# Patient Record
Sex: FEMALE | Race: WHITE | NOT HISPANIC OR LATINO | Employment: FULL TIME | URBAN - METROPOLITAN AREA
[De-identification: names, ages, dates, MRNs, and addresses within clinical notes are randomized per-mention and may not be internally consistent; named-entity substitution may affect disease eponyms.]

---

## 2024-10-03 ENCOUNTER — OFFICE VISIT (OUTPATIENT)
Dept: OBSTETRICS AND GYNECOLOGY | Age: 29
End: 2024-10-03
Payer: COMMERCIAL

## 2024-10-03 VITALS
DIASTOLIC BLOOD PRESSURE: 72 MMHG | WEIGHT: 167 LBS | SYSTOLIC BLOOD PRESSURE: 108 MMHG | HEIGHT: 62 IN | BODY MASS INDEX: 30.73 KG/M2

## 2024-10-03 DIAGNOSIS — E28.2 PCOS (POLYCYSTIC OVARIAN SYNDROME): ICD-10-CM

## 2024-10-03 DIAGNOSIS — Z01.419 ENCOUNTER FOR GYNECOLOGICAL EXAMINATION WITHOUT ABNORMAL FINDING: Primary | ICD-10-CM

## 2024-10-03 DIAGNOSIS — D75.1 SECONDARY POLYCYTHEMIA: ICD-10-CM

## 2024-10-03 DIAGNOSIS — N97.9 INFERTILITY, FEMALE: ICD-10-CM

## 2024-10-03 PROCEDURE — 99385 PREV VISIT NEW AGE 18-39: CPT | Performed by: OBSTETRICS & GYNECOLOGY

## 2024-10-03 RX ORDER — PROPRANOLOL HCL 20 MG
20 TABLET ORAL
COMMUNITY
Start: 2024-10-01

## 2024-10-03 NOTE — PROGRESS NOTES
Subjective     Chief Complaint   Patient presents with    Gynecologic Exam     New Gyn trying for pregnancy       History of Present Illness    Tuyet Bailey is a 29 y.o.  who presents for annual exam.  Patient is a new patient to me.  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  She previously worked for [employer].  She has a history of PCOS and anovulation.  She has difficulty with acne.  She reports prior to oral contraceptive pills she would have a cycle but every 3 months.  When she more recently stopped oral contraceptive pills she had amenorrhea for 4 months.  She is currently working with Dr. Benedict and taking Femara.      Patient has a significant history of polycythemia with hemoglobin levels as high as 17.  Her most recent was 14.  She has had a full workup with hematology.  She was told that her erythropoietin and kidney function was normal.  Treatment is just to donate blood twice yearly.  She was told to avoid iron supplementation even in pregnancy.  She was told that this may be an evolutionary change to adjust for women in the family may have had very heavy bleeding at birth.  Patient does not have a history of nosebleeds or excessive bleeding.  Her mother and sister have not had any trouble with deliveries.    [facility] 6 months ago.  She does not want to have another Pap right now.      She occasionally takes Inderal for anxiety  Obstetric History:  OB History          0    Para   0    Term   0       0    AB   0    Living   0         SAB   0    IAB   0    Ectopic   0    Molar   0    Multiple   0    Live Births   0               Menstrual History:     Patient's last menstrual period was 2024.         Current contraception: none  History of abnormal Pap smear: no  Received Gardasil immunization: yes  Perform regular self breast exam : no  Family history of uterine or ovarian cancer:  "no  Family History of colon cancer: no  Family history of breast cancer: no    Mammogram: not indicated.  Colonoscopy: not indicated.  DEXA: not indicated.    Exercise: exercises 5 times a week  Calcium/Vitamin D: adequate intake    The following portions of the patient's history were reviewed and updated as appropriate: allergies, current medications, past family history, past medical history, past social history, past surgical history, and problem list.      Objective   Physical Exam    /72   Ht 157.5 cm (62\")   Wt 75.8 kg (167 lb)   LMP 09/16/2024   BMI 30.54 kg/m²     General:   alert, appears stated age and cooperative   Neck: thyroid normal to palpation   Heart: regular rate and rhythm   Lungs: clear to auscultation bilaterally   Abdomen: soft, non-tender, without masses or organomegaly   Breast: inspection negative, no nipple discharge or bleeding, no masses or nodularity palpable   Vulva: normal, Bartholin's, Urethra, [place]'s normal   Vagina: normal mucosa, normal discharge   Cervix: no cervical motion tenderness and no lesions   Uterus: non-tender, normal shape and consistency   Adnexa: no mass, fullness, tenderness   Rectal: not indicated     Assessment & Plan   Diagnoses and all orders for this visit:    1. Encounter for gynecological examination without abnormal finding (Primary)    2. PCOS (polycystic ovarian syndrome)    3. Infertility, female    4. Secondary polycythemia    Patient will continue to work with Dr. Benedcit and call us back when she is pregnant.  We discussed she can take a vitamin without iron during the pregnancy and we can continue to follow her levels along with hematology.  Patient is currently on 7.5 mg of Femara and supplementing with vaginal progesterone.    All questions answered.  Breast self exam technique reviewed and patient encouraged to perform self-exam monthly.  Discussed healthy lifestyle modifications.  Recommended 30 minutes of aerobic exercise five times per " week.  Discussed calcium needs to prevent osteoporosis.

## 2024-10-22 ENCOUNTER — LAB (OUTPATIENT)
Dept: LAB | Facility: HOSPITAL | Age: 29
End: 2024-10-22
Payer: COMMERCIAL

## 2024-10-22 ENCOUNTER — TRANSCRIBE ORDERS (OUTPATIENT)
Dept: ADMINISTRATIVE | Facility: HOSPITAL | Age: 29
End: 2024-10-22
Payer: COMMERCIAL

## 2024-10-22 DIAGNOSIS — E22.1 IDIOPATHIC HYPERPROLACTINEMIA: Primary | ICD-10-CM

## 2024-10-22 DIAGNOSIS — E22.1 IDIOPATHIC HYPERPROLACTINEMIA: ICD-10-CM

## 2024-10-22 LAB — TSH SERPL DL<=0.05 MIU/L-ACNC: 2.22 UIU/ML (ref 0.27–4.2)

## 2024-10-22 PROCEDURE — 36415 COLL VENOUS BLD VENIPUNCTURE: CPT

## 2024-10-22 PROCEDURE — 84443 ASSAY THYROID STIM HORMONE: CPT

## 2024-12-02 ENCOUNTER — TELEPHONE (OUTPATIENT)
Dept: OBSTETRICS AND GYNECOLOGY | Age: 29
End: 2024-12-02
Payer: COMMERCIAL

## 2024-12-02 DIAGNOSIS — O21.9 NAUSEA/VOMITING IN PREGNANCY: Primary | ICD-10-CM

## 2024-12-02 RX ORDER — VALACYCLOVIR HYDROCHLORIDE 1 G/1
1000 TABLET, FILM COATED ORAL 2 TIMES DAILY
Qty: 4 TABLET | Refills: 0 | Status: SHIPPED | OUTPATIENT
Start: 2024-12-02 | End: 2024-12-05

## 2024-12-02 RX ORDER — DOXYLAMINE SUCCINATE AND PYRIDOXINE HYDROCHLORIDE 20; 20 MG/1; MG/1
1 TABLET, EXTENDED RELEASE ORAL 2 TIMES DAILY
Qty: 60 TABLET | Refills: 3 | Status: SHIPPED | OUTPATIENT
Start: 2024-12-02

## 2024-12-02 NOTE — TELEPHONE ENCOUNTER
PT seen by [facility] with Dr Benedict. PT currently 6wks and is scheduled to see us at 10/11weeks on January 3rd. PT states that Unisom is working but was wondering if she could get prescribed Bonjesta?

## 2025-01-02 ENCOUNTER — INITIAL PRENATAL (OUTPATIENT)
Dept: OBSTETRICS AND GYNECOLOGY | Age: 30
End: 2025-01-02
Payer: COMMERCIAL

## 2025-01-02 VITALS — BODY MASS INDEX: 33.4 KG/M2 | WEIGHT: 182.6 LBS | DIASTOLIC BLOOD PRESSURE: 72 MMHG | SYSTOLIC BLOOD PRESSURE: 124 MMHG

## 2025-01-02 DIAGNOSIS — Z3A.10 10 WEEKS GESTATION OF PREGNANCY: ICD-10-CM

## 2025-01-02 DIAGNOSIS — Z34.81 PRENATAL CARE, SUBSEQUENT PREGNANCY, FIRST TRIMESTER: ICD-10-CM

## 2025-01-02 DIAGNOSIS — E28.2 PCOS (POLYCYSTIC OVARIAN SYNDROME): ICD-10-CM

## 2025-01-02 DIAGNOSIS — D75.1 SECONDARY POLYCYTHEMIA: ICD-10-CM

## 2025-01-02 DIAGNOSIS — Z34.90 PREGNANCY, UNSPECIFIED GESTATIONAL AGE: ICD-10-CM

## 2025-01-02 DIAGNOSIS — Z13.89 SCREENING FOR BLOOD OR PROTEIN IN URINE: Primary | ICD-10-CM

## 2025-01-02 DIAGNOSIS — Z31.430 ENCOUNTER OF FEMALE FOR TESTING FOR GENETIC DISEASE CARRIER STATUS FOR PROCREATIVE MANAGEMENT: ICD-10-CM

## 2025-01-02 LAB
FERRITIN SERPL-MCNC: 41.2 NG/ML (ref 13–150)
GLUCOSE UR STRIP-MCNC: NEGATIVE MG/DL
PROT UR STRIP-MCNC: NEGATIVE MG/DL

## 2025-01-03 PROBLEM — O26.899 RH NEGATIVE, ANTEPARTUM: Status: ACTIVE | Noted: 2025-01-03

## 2025-01-03 PROBLEM — Z67.91 RH NEGATIVE, ANTEPARTUM: Status: ACTIVE | Noted: 2025-01-03

## 2025-01-03 LAB
ABO GROUP BLD: ABNORMAL
BASOPHILS # BLD AUTO: 0.1 X10E3/UL (ref 0–0.2)
BASOPHILS NFR BLD AUTO: 1 %
BLD GP AB SCN SERPL QL: NEGATIVE
EOSINOPHIL # BLD AUTO: 0.1 X10E3/UL (ref 0–0.4)
EOSINOPHIL NFR BLD AUTO: 1 %
ERYTHROCYTE [DISTWIDTH] IN BLOOD BY AUTOMATED COUNT: 13.2 % (ref 11.7–15.4)
HBV SURFACE AG SERPL QL IA: NEGATIVE
HCT VFR BLD AUTO: 44.3 % (ref 34–46.6)
HCV AB SERPL QL IA: NORMAL
HCV IGG SERPL QL IA: NON REACTIVE
HGB BLD-MCNC: 15 G/DL (ref 11.1–15.9)
HIV 1+2 AB+HIV1 P24 AG SERPL QL IA: NON REACTIVE
IMM GRANULOCYTES # BLD AUTO: 0 X10E3/UL (ref 0–0.1)
IMM GRANULOCYTES NFR BLD AUTO: 0 %
LYMPHOCYTES # BLD AUTO: 1.7 X10E3/UL (ref 0.7–3.1)
LYMPHOCYTES NFR BLD AUTO: 17 %
MCH RBC QN AUTO: 29.3 PG (ref 26.6–33)
MCHC RBC AUTO-ENTMCNC: 33.9 G/DL (ref 31.5–35.7)
MCV RBC AUTO: 87 FL (ref 79–97)
MONOCYTES # BLD AUTO: 0.9 X10E3/UL (ref 0.1–0.9)
MONOCYTES NFR BLD AUTO: 9 %
NEUTROPHILS # BLD AUTO: 7.1 X10E3/UL (ref 1.4–7)
NEUTROPHILS NFR BLD AUTO: 72 %
PLATELET # BLD AUTO: 260 X10E3/UL (ref 150–450)
RBC # BLD AUTO: 5.12 X10E6/UL (ref 3.77–5.28)
RH BLD: NEGATIVE
RPR SER QL: NON REACTIVE
RUBV IGG SERPL IA-ACNC: 10.9 INDEX
WBC # BLD AUTO: 9.8 X10E3/UL (ref 3.4–10.8)

## 2025-01-03 NOTE — PROGRESS NOTES
Chief Complaint   Patient presents with    Initial Prenatal Visit     New Ob U/s Lmp 10/22/24  Cc: no complaints today        HPI: 29 y.o.  at 10w5d by ultrasound and IUI dating.  Patient worked with Dr. Benedict.  She had IUI with Femara and trigger shot.  Patient is here today with her .  She is very excited about the pregnancy.  Patient has significant polycythemia.  She follows with Dr. Castillo from [facility].  She typically has to donate blood about 2 times a year to maintain hematocrit below 45.  She has talked with him about pregnancy and he recommended avoiding prenatal vitamins with iron unless the patient is anemic.  Patient is taking a gummy vitamin with no iron.  He also recommended against transfusion unless hemoglobin is less than 7.  Patient has noted some rapid weight gain.  She has not been very active due to fatigue and has been craving a lot of carbohydrates.  She does not think she can do a 1 hour glucose test today but can come back soon to do that.    Full history is reviewed    Relevant data reviewed:    Last OB US Data (since 2024)       None          Vitals:    25 0905   BP: 124/72   Weight: 82.8 kg (182 lb 9.6 oz)     Total weight gain for pregnancy:  Not found.      Review of systems:   Gen: fatigue  CV:     negative  GI: negative  :   negative  MS:    negative  Neuro: negative  Pul: negative    Physical Exam  Constitutional:       General: She is not in acute distress.     Appearance: Normal appearance. She is obese. She is not ill-appearing.   Cardiovascular:      Rate and Rhythm: Normal rate and regular rhythm.   Pulmonary:      Breath sounds: Normal breath sounds.   Abdominal:      General: Abdomen is flat.      Palpations: Abdomen is soft.   Neurological:      Mental Status: She is alert.   Psychiatric:         Mood and Affect: Mood normal.         Thought Content: Thought content normal.         Judgment: Judgment normal.         A/P  1. Intrauterine pregnancy  at 10w5d   2. Pregnancy Risk:  HIGH RISK     Diagnoses and all orders for this visit:    1. Screening for blood or protein in urine (Primary)  -     POC Urinalysis Dipstick  -     Urine Culture - Urine, Urine, Random Void    2. 10 weeks gestation of pregnancy  -     OB Panel With HIV and RPR  -     Gestational Diabetes Screen 1 Hour; Future  -     Chlamydia trachomatis, Neisseria gonorrhoeae, PCR - Urine, Urine, Clean Catch    3. Prenatal care, subsequent pregnancy, first trimester  -     Mesfin Panorama Prenatal Test: Chromosomes 13, 18, 21, X & Y: Triploidy 22Q.11.2 Deletion - Blood,    4. Encounter of female for testing for genetic disease carrier status for procreative management  -     Cancel: Apttus 14 (Pan-Ethnic Standard) - Blood,    5. Secondary polycythemia  Overview:  Patient's hematologist is Dr Anna Corbin  He recommended prenatal vitamins without iron unless patient is anemic and avoiding transfusions unless hemoglobin less than 7.    Orders:  -     Ferritin    6. PCOS (polycystic ovarian syndrome)  -     Gestational Diabetes Screen 1 Hour; Future    7. Pregnancy, unspecified gestational age    Patient has secondary polycythemia.  She does follow with hematology through [facility].  Hematology had recommended avoiding prenatal vitamins with iron.  Patient is taking a gummy prenatal vitamin.  We discussed high iron needs during pregnancy.  We will need to follow her for anemia.  Iron was recommended if the patient does become anemic.  Blood transfusion was recommended to be avoided unless the patient is hemoglobin less than 7.  She will continue to follow with hematology and I will refer also to [facility].  Check ferritin today.    Patient has had rapid weight gain and has history of PCOS.  Recommend early 1 hour    Patient has severe NSAID allergy so she cannot take low-dose aspirin.    Patient previously had carrier testing through Dr. Benedict's office.  Cell free DNA testing will be done today along  with initial OB labs.    New OB information was reviewed in detail with the patient and her .  Folder was also given.    Nutrition and weight gain were addressed.  Practice OB call structure was discussed.   Encouraged exercise at least 3 x weekly   -----------------------  PLAN:   Return in about 4 weeks (around 1/30/2025) for labs on 1/15 .    Jim Goldberg MD  1/2/2025 19:10 EST

## 2025-01-04 LAB
BACTERIA UR CULT: NORMAL
BACTERIA UR CULT: NORMAL
C TRACH RRNA SPEC QL NAA+PROBE: NEGATIVE
N GONORRHOEA RRNA SPEC QL NAA+PROBE: NEGATIVE

## 2025-01-15 ENCOUNTER — LAB (OUTPATIENT)
Dept: OBSTETRICS AND GYNECOLOGY | Age: 30
End: 2025-01-15
Payer: COMMERCIAL

## 2025-01-15 ENCOUNTER — TRANSCRIBE ORDERS (OUTPATIENT)
Dept: ULTRASOUND IMAGING | Facility: HOSPITAL | Age: 30
End: 2025-01-15
Payer: COMMERCIAL

## 2025-01-15 DIAGNOSIS — D75.1 SECONDARY POLYCYTHEMIA: Primary | ICD-10-CM

## 2025-01-15 DIAGNOSIS — Z13.1 SCREENING FOR DIABETES MELLITUS: Primary | ICD-10-CM

## 2025-01-15 DIAGNOSIS — Z3A.10 10 WEEKS GESTATION OF PREGNANCY: ICD-10-CM

## 2025-01-15 DIAGNOSIS — E28.2 PCOS (POLYCYSTIC OVARIAN SYNDROME): ICD-10-CM

## 2025-01-15 LAB — GLUCOSE 1H P 50 G GLC PO SERPL-MCNC: 92 MG/DL (ref 65–139)

## 2025-01-21 ENCOUNTER — HOSPITAL ENCOUNTER (OUTPATIENT)
Dept: ULTRASOUND IMAGING | Facility: HOSPITAL | Age: 30
Discharge: HOME | End: 2025-01-21
Admitting: OBSTETRICS & GYNECOLOGY
Payer: COMMERCIAL

## 2025-01-21 ENCOUNTER — OFFICE VISIT (OUTPATIENT)
Dept: OBSTETRICS AND GYNECOLOGY | Facility: CLINIC | Age: 30
End: 2025-01-21
Payer: COMMERCIAL

## 2025-01-21 VITALS
HEIGHT: 62 IN | TEMPERATURE: 97.7 F | DIASTOLIC BLOOD PRESSURE: 90 MMHG | BODY MASS INDEX: 34.56 KG/M2 | HEART RATE: 76 BPM | SYSTOLIC BLOOD PRESSURE: 128 MMHG | WEIGHT: 187.8 LBS

## 2025-01-21 DIAGNOSIS — D75.1 SECONDARY POLYCYTHEMIA: Primary | ICD-10-CM

## 2025-01-21 DIAGNOSIS — O10.919 CHRONIC HYPERTENSION AFFECTING PREGNANCY: ICD-10-CM

## 2025-01-21 DIAGNOSIS — D75.1 SECONDARY POLYCYTHEMIA: ICD-10-CM

## 2025-01-21 PROCEDURE — 76815 OB US LIMITED FETUS(S): CPT

## 2025-01-21 NOTE — PROGRESS NOTES
MATERNAL FETAL MEDICINE Consult Note    Dear Dr Jim Goldberg MD:    Thank you for your kind referral of Tuyet Bailey.  As you know, she is a 29 y.o.   13w3d gestation (Estimated Date of Delivery: 25). This is a consult.      Her antepartum course is complicated by:  Secondary polycythemia  CHTN    Aneuploidy Screening: low risk    HPI: Today, she denies headache, blurry vision, RUQ pain. No vaginal bleeding, no contractions.     Review of History:  Past Medical History:   Diagnosis Date    Anxiety     Chronic hypertension     occasional higher diastolic values- off BP meds x2 years    HSV infection     oral HSV- no outbreaks in years    Infertility, female     IUI pregnancy    Secondary polycythemia      Past Surgical History:   Procedure Laterality Date    WISDOM TOOTH EXTRACTION      no excessive bleeding       Social History     Socioeconomic History    Marital status:      Spouse name: Jose Luis  PT   Tobacco Use    Smoking status: Never     Passive exposure: Never    Smokeless tobacco: Never   Vaping Use    Vaping status: Never Used   Substance and Sexual Activity    Alcohol use: Not Currently    Drug use: Never    Sexual activity: Yes     Partners: Male     Birth control/protection: None     Family History   Problem Relation Age of Onset    Diabetes Father     Kidney cancer Father       Allergies   Allergen Reactions    Nsaids Anaphylaxis     Lip swelling    Sulfa Antibiotics Swelling      Current Outpatient Medications on File Prior to Visit   Medication Sig Dispense Refill    doxylamine-pyridoxine ER (Bonjesta) 20-20 MG tablet controlled-release tablet Take 1 tablet by mouth 2 (Two) Times a Day. 60 tablet 3    Prenatal Vit-Fe Fumarate-FA (PRENATAL VITAMINS PO)        No current facility-administered medications on file prior to visit.        Past obstetric, gynecological, medical, surgical, family and social history reviewed.  Relevant lab work and imaging reviewed.    Review of  "systems  Constitutional:  denies fever, chills, malaise.   ENT/Mouth:  denies sore throat, tinnitus  Eyes: denies vision changes/pain  CV:  denies chest pain  Respiratory:  denies cough/SOB  GI:  denies N/V, diarrhea, abdominal pain.    :   denies dysuria  Skin:  denies lesions or pruritus   Neuro:  denies weakness, focal neurologic symptoms    Vitals:    25 1441 25 1448   BP: 130/90 128/90   BP Location: Right arm Left arm   Patient Position: Sitting Sitting   Pulse: 76    Temp: 97.7 °F (36.5 °C)    TempSrc: Temporal    Weight: 85.2 kg (187 lb 12.8 oz)    Height: 157.5 cm (62\")        PHYSICAL EXAM   GENERAL: Not in acute distress, AAOx3, pleasant  CARDIO: regular rate and rhythm  PULM: symmetric chest rise, speaking in complete sentences without difficulty  NEURO: awake, alert and oriented to person, place, and time  ABDOMINAL: No fundal tenderness, no rebound or guarding, gravid  EXTREMITIES: no bilateral lower extremity edema/tenderness  SKIN: Warm, well-perfused      ULTRASOUND   Please view full ultrasound note on Imaging tab   Live viable intrauterine pregnancy.    bpm, which is normal.   Grossly normal appearing early anatomy.   Size consistent with dates.   NT 1 mm, which is normal.      ASSESSMENT/COUNSELIN y.o.   13w3d gestation (Estimated Date of Delivery: 25).       -Pregnancy  [ X ] stable  [   ] improving [  ] worsening    Diagnoses and all orders for this visit:    1. Secondary polycythemia (Primary)  Overview:  Patient's hematologist is Dr Anna Corbin  He recommended prenatal vitamins without iron unless patient is anemic and avoiding transfusions unless hemoglobin less than 7.      2. Chronic hypertension affecting pregnancy         Secondary mild polycythemia:  Pt reports she has a very mild diagnosis and it is not polycythemia vera or one of the other more serious types.    Patient's hematologist is Dr Anna Corbin--He recommended prenatal vitamins " without iron unless patient is anemic and avoiding transfusions unless hemoglobin less than 7.  We discussed this today.  For polycythemia vera and other polycythemias, we often recommend lovenox during pregnancy due to increased clotting potential.  Will discuss with her hematologist.  I mentioned low dose aspirin, but patient has an allergy so this is not an option.      She was previously tested further for JAK2 ([id number]), which was not mutated. Her EPO level was low normal at 8 and she typically manages this with 2 phlebotomies a year.  I would recommend monthly CBC and if her Hct >45 per heme--they need to see her and do a phlebotomy.      Secondary polycythemia during pregnancy can be associated with increased risk of thromboembolism, leading to obstetric complications such as fetal loss, FGR, prematurity, and maternal thromboembolism.  I suspect because of how mild this is, she  likely will not be at significantly increased risk.  She does have CHTN, also mild, so will watch carefully for preE and FGR.      CHTN  No meds, no ASA due to allergy  We discussed the risks of chronic hypertension including intrauterine growth restriction, increased risk of preeclampsia, increased risk of premature delivery, and increased risk for placental abruption.  I reassured her that with mild hypertension, no underlying cardiac disease, and normal renal function, most women do well in pregnancy.    I explained that acceptable blood pressures in pregnancies with chronic hypertension and without renal damage are 120-140/70-90s. Newer data from the [facility] (2022, CHAP TRIAL), supports more aggressive bp treatment to below 140/90 (previously ,160 in CHTN) and that this does not impair fetal growth or well-being but reduces risks of pre-eclampsia,  birth, and other pregnancy morbidity.  I discussed this with the patient.  She said she runs mostly 120-130's at home with 80-90's diastolic.  If she has more consistently  higher than this (>140/95), would recommend starting procardia XR 30 daily.      I recommended serial growth ultrasounds every 4 weeks  to ensure appropriate fetal growth. In addition,  fetal testing 1-2x weekly should be initiated around 32 weeks gestation.  I would recommend a 38 week delivery given CHTN.   She may require closer to 37 depending on her control closer to delivery.      Discussed importance of ambulatory monitoring at different times during the day.  She will bring a log when she comes back next time for growth.        Summary of Plan  -Serial growth ultrasounds every 4 weeks (MFM)  -Starting at 32 weeks: at least weekly fetal  surveillance until delivery   -Will touch base with heme about anticoagulation.   -CBC's monthly at OB  -Delivery 38 weeks unless indicated sooner.      Follow-up: monthly    Thank you for the consult and opportunity to care for this patient.  Please feel free to reach out with any questions or concerns.      I spent 35 minutes caring for this patient on this date of service. This time includes time spent by me in the following activities: preparing for the visit, reviewing tests, obtaining and/or reviewing a separately obtained history, performing a medically appropriate examination and/or evaluation, counseling and educating the patient/family/caregiver and independently interpreting results and communicating that information with the patient/family/caregiver with greater than 50% spent in counseling and coordination of care.     I spent 6 minutes on the separately reported service of US imaging not included in the time used to support the E/M service also reported today.      Shruti Morton MD FACOG  Maternal Fetal Medicine-[facility]  Office: [phone]  [email or web address]

## 2025-01-21 NOTE — LETTER
2025     Jim Goldberg MD  [address]  [address]  [place] [facility] [address]    Patient: Tuyet Bailey   YOB: 1995   Date of Visit: 2025       Dear Jim Goldberg MD,    Thank you for referring Tuyet Bailey to me for evaluation. Below is a copy of my consult note.    If you have questions, please do not hesitate to call me. I look forward to following Tuyet along with you.         Sincerely,        Shruti Morton MD    MATERNAL FETAL MEDICINE Consult Note    Dear Dr Jim Goldberg MD:    Thank you for your kind referral of Tuyet Bailey.  As you know, she is a 29 y.o.   13w3d gestation (Estimated Date of Delivery: 25). This is a consult.      Her antepartum course is complicated by:  Secondary polycythemia  CHTN    Aneuploidy Screening: low risk    HPI: Today, she denies headache, blurry vision, RUQ pain. No vaginal bleeding, no contractions.     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 outbreaks in years   • Infertility, female     IUI pregnancy   • Secondary polycythemia      Past Surgical History:   Procedure Laterality Date   • WISDOM TOOTH EXTRACTION      no excessive bleeding       Social History     Socioeconomic History   • Marital status:      Spouse name: Jose Luis  PT   Tobacco Use   • Smoking status: Never     Passive exposure: Never   • Smokeless tobacco: Never   Vaping Use   • Vaping status: Never Used   Substance and Sexual Activity   • Alcohol use: Not Currently   • Drug use: Never   • Sexual activity: Yes     Partners: Male     Birth control/protection: None     Family History   Problem Relation Age of Onset   • Diabetes Father    • Kidney cancer Father       Allergies   Allergen Reactions   • Nsaids Anaphylaxis     Lip swelling   • Sulfa Antibiotics Swelling      Current Outpatient Medications  "on File Prior to Visit   Medication Sig Dispense Refill   • doxylamine-pyridoxine ER (Bonjesta) 20-20 MG tablet controlled-release tablet Take 1 tablet by mouth 2 (Two) Times a Day. 60 tablet 3   • Prenatal Vit-Fe Fumarate-FA (PRENATAL VITAMINS PO)        No current facility-administered medications on file prior to visit.        Past obstetric, gynecological, medical, surgical, family and social history reviewed.  Relevant lab work and imaging reviewed.    Review of systems  Constitutional:  denies fever, chills, malaise.   ENT/Mouth:  denies sore throat, tinnitus  Eyes: denies vision changes/pain  CV:  denies chest pain  Respiratory:  denies cough/SOB  GI:  denies N/V, diarrhea, abdominal pain.    :   denies dysuria  Skin:  denies lesions or pruritus   Neuro:  denies weakness, focal neurologic symptoms    Vitals:    25 1441 25 1448   BP: 130/90 128/90   BP Location: Right arm Left arm   Patient Position: Sitting Sitting   Pulse: 76    Temp: 97.7 °F (36.5 °C)    TempSrc: Temporal    Weight: 85.2 kg (187 lb 12.8 oz)    Height: 157.5 cm (62\")        PHYSICAL EXAM   GENERAL: Not in acute distress, AAOx3, pleasant  CARDIO: regular rate and rhythm  PULM: symmetric chest rise, speaking in complete sentences without difficulty  NEURO: awake, alert and oriented to person, place, and time  ABDOMINAL: No fundal tenderness, no rebound or guarding, gravid  EXTREMITIES: no bilateral lower extremity edema/tenderness  SKIN: Warm, well-perfused      ULTRASOUND   Please view full ultrasound note on Imaging tab   Live viable intrauterine pregnancy.    bpm, which is normal.   Grossly normal appearing early anatomy.   Size consistent with dates.   NT 1 mm, which is normal.      ASSESSMENT/COUNSELIN y.o.   13w3d gestation (Estimated Date of Delivery: 25).       -Pregnancy  [ X ] stable  [   ] improving [  ] worsening    Diagnoses and all orders for this visit:    1. Secondary polycythemia " (Primary)  Overview:  Patient's hematologist is Dr Anna Corbin  He recommended prenatal vitamins without iron unless patient is anemic and avoiding transfusions unless hemoglobin less than 7.      2. Chronic hypertension affecting pregnancy         Secondary mild polycythemia:  Pt reports she has a very mild diagnosis and it is not polycythemia vera or one of the other more serious types.    Patient's hematologist is Dr Anna Corbin--He recommended prenatal vitamins without iron unless patient is anemic and avoiding transfusions unless hemoglobin less than 7.  We discussed this today.  For polycythemia vera and other polycythemias, we often recommend lovenox during pregnancy due to increased clotting potential.  Will discuss with her hematologist.  I mentioned low dose aspirin, but patient has an allergy so this is not an option.      She was previously tested further for JAK2 ([id number]), which was not mutated. Her EPO level was low normal at 8 and she typically manages this with 2 phlebotomies a year.  I would recommend monthly CBC and if her Hct >45 per heme--they need to see her and do a phlebotomy.      Secondary polycythemia during pregnancy can be associated with increased risk of thromboembolism, leading to obstetric complications such as fetal loss, FGR, prematurity, and maternal thromboembolism.  I suspect because of how mild this is, she  likely will not be at significantly increased risk.  She does have CHTN, also mild, so will watch carefully for preE and FGR.      CHTN  No meds, no ASA due to allergy  We discussed the risks of chronic hypertension including intrauterine growth restriction, increased risk of preeclampsia, increased risk of premature delivery, and increased risk for placental abruption.  I reassured her that with mild hypertension, no underlying cardiac disease, and normal renal function, most women do well in pregnancy.    I explained that acceptable blood pressures in pregnancies  with chronic hypertension and without renal damage are 120-140/70-90s. Newer data from the [facility] (2022, CHAP TRIAL), supports more aggressive bp treatment to below 140/90 (previously ,160 in CHTN) and that this does not impair fetal growth or well-being but reduces risks of pre-eclampsia,  birth, and other pregnancy morbidity.  I discussed this with the patient.  She said she runs mostly 120-130's at home with 80-90's diastolic.  If she has more consistently higher than this (>140/95), would recommend starting procardia XR 30 daily.      I recommended serial growth ultrasounds every 4 weeks  to ensure appropriate fetal growth. In addition,  fetal testing 1-2x weekly should be initiated around 32 weeks gestation.  I would recommend a 38 week delivery given CHTN.   She may require closer to 37 depending on her control closer to delivery.      Discussed importance of ambulatory monitoring at different times during the day.  She will bring a log when she comes back next time for growth.        Summary of Plan  -Serial growth ultrasounds every 4 weeks (MFM)  -Starting at 32 weeks: at least weekly fetal  surveillance until delivery   -Will touch base with heme about anticoagulation.   -CBC's monthly at OB  -Delivery 38 weeks unless indicated sooner.      Follow-up: monthly    Thank you for the consult and opportunity to care for this patient.  Please feel free to reach out with any questions or concerns.      I spent 35 minutes caring for this patient on this date of service. This time includes time spent by me in the following activities: preparing for the visit, reviewing tests, obtaining and/or reviewing a separately obtained history, performing a medically appropriate examination and/or evaluation, counseling and educating the patient/family/caregiver and independently interpreting results and communicating that information with the patient/family/caregiver with greater than 50% spent in  counseling and coordination of care.     I spent 6 minutes on the separately reported service of US imaging not included in the time used to support the E/M service also reported today.      Shruti Morton MD [facility]  Maternal Fetal Medicine-[facility]  Office: [phone]  [email or web address]

## 2025-01-23 ENCOUNTER — TELEPHONE (OUTPATIENT)
Dept: OBSTETRICS AND GYNECOLOGY | Facility: CLINIC | Age: 30
End: 2025-01-23
Payer: COMMERCIAL

## 2025-01-23 RX ORDER — ENOXAPARIN SODIUM 100 MG/ML
40 INJECTION SUBCUTANEOUS
Qty: 30 ML | Refills: 4 | Status: SHIPPED | OUTPATIENT
Start: 2025-01-23

## 2025-01-23 NOTE — TELEPHONE ENCOUNTER
Call placed to Tuyet to update on care coordination and lovenox recommendation from hematologist Dr. Castillo. Patient notified about message sent by Dr. Morton about lovenox regimen. Patient encouraged to schedule follow up with Dr. Castillo's office. Patient states she will schedule appointment. Patient also wishes to move [facility] appointment to be seen by Dr. Morton instead of other providers. Explained that [facility] is a shared practice office and that we cannot guarantee patient will be seen by same provider at every appointment. [facility] will work to schedule patient with Dr. Morton as best as we can, will notify patient if appointment changed.

## 2025-01-30 ENCOUNTER — ROUTINE PRENATAL (OUTPATIENT)
Dept: OBSTETRICS AND GYNECOLOGY | Age: 30
End: 2025-01-30
Payer: COMMERCIAL

## 2025-01-30 VITALS — BODY MASS INDEX: 34.57 KG/M2 | DIASTOLIC BLOOD PRESSURE: 78 MMHG | WEIGHT: 189 LBS | SYSTOLIC BLOOD PRESSURE: 112 MMHG

## 2025-01-30 DIAGNOSIS — Z3A.15 15 WEEKS GESTATION OF PREGNANCY: ICD-10-CM

## 2025-01-30 DIAGNOSIS — Z67.91 RH NEGATIVE, ANTEPARTUM: ICD-10-CM

## 2025-01-30 DIAGNOSIS — O26.899 RH NEGATIVE, ANTEPARTUM: ICD-10-CM

## 2025-01-30 DIAGNOSIS — D75.1 SECONDARY POLYCYTHEMIA: ICD-10-CM

## 2025-01-30 DIAGNOSIS — Z13.89 SCREENING FOR BLOOD OR PROTEIN IN URINE: Primary | ICD-10-CM

## 2025-01-30 DIAGNOSIS — O10.919 CHRONIC HYPERTENSION AFFECTING PREGNANCY: ICD-10-CM

## 2025-01-30 LAB
GLUCOSE UR STRIP-MCNC: NEGATIVE MG/DL
PROT UR STRIP-MCNC: NEGATIVE MG/DL

## 2025-01-30 NOTE — PROGRESS NOTES
CC- pregnancy    HPI: 29 y.o.  at 14w5d patient has seen maternal-fetal medicine.  She has been started on Lovenox due to polycythemia and increased risk of DVT.  Patient will have CBC drawn with hematology next week.    EXAM:  Last OB US Data (since 2024)       None          Vitals:    25 1452   BP: 112/78   Weight: 85.7 kg (189 lb)     Total weight gain for pregnancy:  9.979 kg (22 lb)  Cell free DNA is normal.  Baby is a boy  Weight gain for pregnancy is high  Doppler tones are positive  Normal blood pressure no proteinuria today  Early 1 hour glucose tolerance test was normal at 92    A/P  1. Intrauterine pregnancy at 14w5d   2. Pregnancy Risk:  HIGH RISK    Diagnoses and all orders for this visit:    1. Screening for blood or protein in urine (Primary)  -     POC Urinalysis Dipstick    2. Secondary polycythemia  Overview:  Patient's hematologist is Dr Anna Corbin  He recommended prenatal vitamins without iron unless patient is anemic and avoiding transfusions unless hemoglobin less than 7.    Orders:  -     Cancel: CBC (No Diff)    3. 15 weeks gestation of pregnancy  -     Alpha Fetoprotein, Maternal; Future    4. Rh negative, antepartum    5. Chronic hypertension affecting pregnancy      -----------------------  Polycythemia-continue to follow with hematology at [facility].  Plan for next CBC with them next week at her appointment.  If hematocrit is greater than 45 patient may need phlebotomy.  Continue Lovenox due to increased risk of thromboembolism  Chronic hypertension on no medications and no aspirin due to allergy.  Blood pressure is good today.  Discussed if she has consistently higher than 140/95 would recommend Procardia extended release.  Serial growth every 4 weeks after 20 weeks.   fetal testing 1-2 times a week at 32 weeks gestation.  Delivery at 38 weeks for chronic hypertension.  37 weeks if she develops preeclampsia.  Patient will return for AFP next week  [facility]  appointment on 3/3    Jim Goldberg MD  1/30/2025 17:59 EST

## 2025-02-06 ENCOUNTER — TELEPHONE (OUTPATIENT)
Dept: OBSTETRICS AND GYNECOLOGY | Age: 30
End: 2025-02-06
Payer: COMMERCIAL

## 2025-02-06 DIAGNOSIS — D75.1 SECONDARY POLYCYTHEMIA: Primary | ICD-10-CM

## 2025-02-25 ENCOUNTER — TRANSCRIBE ORDERS (OUTPATIENT)
Dept: ULTRASOUND IMAGING | Facility: HOSPITAL | Age: 30
End: 2025-02-25
Payer: COMMERCIAL

## 2025-02-25 DIAGNOSIS — D75.1 SECONDARY POLYCYTHEMIA: ICD-10-CM

## 2025-03-03 ENCOUNTER — OFFICE VISIT (OUTPATIENT)
Dept: OBSTETRICS AND GYNECOLOGY | Facility: CLINIC | Age: 30
End: 2025-03-03
Payer: COMMERCIAL

## 2025-03-03 ENCOUNTER — HOSPITAL ENCOUNTER (OUTPATIENT)
Dept: ULTRASOUND IMAGING | Facility: HOSPITAL | Age: 30
Discharge: HOME | End: 2025-03-03
Admitting: NURSE PRACTITIONER
Payer: COMMERCIAL

## 2025-03-03 VITALS
OXYGEN SATURATION: 99 % | SYSTOLIC BLOOD PRESSURE: 111 MMHG | BODY MASS INDEX: 35.7 KG/M2 | TEMPERATURE: 98.4 F | DIASTOLIC BLOOD PRESSURE: 73 MMHG | HEART RATE: 94 BPM | WEIGHT: 194 LBS | HEIGHT: 62 IN

## 2025-03-03 DIAGNOSIS — D75.1 SECONDARY POLYCYTHEMIA: Primary | ICD-10-CM

## 2025-03-03 DIAGNOSIS — O10.919 CHRONIC HYPERTENSION AFFECTING PREGNANCY: ICD-10-CM

## 2025-03-03 DIAGNOSIS — D75.1 SECONDARY POLYCYTHEMIA: ICD-10-CM

## 2025-03-03 PROCEDURE — 76811 OB US DETAILED SNGL FETUS: CPT

## 2025-03-03 NOTE — PROGRESS NOTES
MATERNAL FETAL MEDICINE Consult Note    Dear Dr Jim Goldberg MD:    Thank you for your kind referral of Tuyet Bailey.  As you know, she is a 29 y.o.   19w2d gestation (Estimated Date of Delivery: 25). This is a consult.      Her antepartum course is complicated by:  Secondary polycythemia  CHTN    Aneuploidy Screening: low risk    HPI: Today, she denies headache, blurry vision, RUQ pain. No vaginal bleeding, no contractions.     Review of History:  Past Medical History:   Diagnosis Date    Anxiety     Chronic hypertension     occasional higher diastolic values- off BP meds x2 years    HSV infection     oral HSV- no outbreaks in years    Infertility, female     IUI pregnancy    Secondary polycythemia      Past Surgical History:   Procedure Laterality Date    WISDOM TOOTH EXTRACTION      no excessive bleeding       Social History     Socioeconomic History    Marital status:      Spouse name: Jose Luis  PT   Tobacco Use    Smoking status: Never     Passive exposure: Never    Smokeless tobacco: Never   Vaping Use    Vaping status: Never Used   Substance and Sexual Activity    Alcohol use: Not Currently    Drug use: Never    Sexual activity: Yes     Partners: Male     Birth control/protection: None     Family History   Problem Relation Age of Onset    Diabetes Father     Kidney cancer Father       Allergies   Allergen Reactions    Nsaids Anaphylaxis     Lip swelling    Sulfa Antibiotics Swelling      Current Outpatient Medications on File Prior to Visit   Medication Sig Dispense Refill    doxylamine-pyridoxine ER (Bonjesta) 20-20 MG tablet controlled-release tablet Take 1 tablet by mouth 2 (Two) Times a Day. 60 tablet 3    Enoxaparin Sodium (LOVENOX) 40 MG/0.4ML solution prefilled syringe syringe Inject 0.4 mL under the skin into the appropriate area as directed Daily. 30 mL 4    Prenatal Vit-Fe Fumarate-FA (PRENATAL VITAMINS PO)        No current facility-administered medications on file prior to visit.  "       Past obstetric, gynecological, medical, surgical, family and social history reviewed.  Relevant lab work and imaging reviewed.    Review of systems  Constitutional:  denies fever, chills, malaise.   ENT/Mouth:  denies sore throat, tinnitus  Eyes: denies vision changes/pain  CV:  denies chest pain  Respiratory:  denies cough/SOB  GI:  denies N/V, diarrhea, abdominal pain.    :   denies dysuria  Skin:  denies lesions or pruritus   Neuro:  denies weakness, focal neurologic symptoms    Vitals:    25 1351   BP: 111/73   BP Location: Right arm   Patient Position: Sitting   Pulse: 94   Temp: 98.4 °F (36.9 °C)   TempSrc: Temporal   SpO2: 99%   Weight: 88 kg (194 lb)   Height: 157.5 cm (62\")       PHYSICAL EXAM   GENERAL: Not in acute distress, AAOx3, pleasant  CARDIO: regular rate and rhythm  PULM: symmetric chest rise, speaking in complete sentences without difficulty  NEURO: awake, alert and oriented to person, place, and time  ABDOMINAL: No fundal tenderness, no rebound or guarding, gravid  EXTREMITIES: no bilateral lower extremity edema/tenderness  SKIN: Warm, well-perfused      ULTRASOUND   Please view full ultrasound note on Imaging tab   Breech presentation.  Anterior placenta.  MVP 4.0 cm, which is normal.    g (AC 50%)  Normal appearing anatomy except incomplete heart views due to fetal position.    Transabdominal CL >3.5 cm, which is normal.      ASSESSMENT/COUNSELIN y.o.   19w2d gestation (Estimated Date of Delivery: 25).     -Pregnancy  [ X ] stable  [   ] improving [  ] worsening    Diagnoses and all orders for this visit:    1. Secondary polycythemia (Primary)  Overview:  Patient's hematologist is Dr Anna Corbin  He recommended prenatal vitamins without iron unless patient is anemic and avoiding transfusions unless hemoglobin less than 7.      2. Chronic hypertension affecting pregnancy         Secondary mild polycythemia:  Previously counseled.  Pt started on ppx " lovenox and to continue x 6 weeks PP.     Patient's hematologist is Dr Anna Corbin--He recommended prenatal vitamins without iron unless patient is anemic and avoiding transfusions unless hemoglobin less than 7.      She was previously tested further for JAK2 ([id number]), which was not mutated. Her EPO level was low normal at 8 and she typically manages this with 2 phlebotomies a year.  I would recommend monthly CBC and if her Hct >45 per heme--they need to see her and do a phlebotomy.      Secondary polycythemia during pregnancy can be associated with increased risk of thromboembolism, leading to obstetric complications such as fetal loss, FGR, prematurity, and maternal thromboembolism.  I suspect because of how mild this is, she  likely will not be at significantly increased risk.  She does have CHTN, also mild, so will watch carefully for preE and FGR.      CHTN  No meds, no ASA due to allergy  Previously counseled.      I explained that acceptable blood pressures in pregnancies with chronic hypertension and without renal damage are 120-140/70-90s. Newer data from the [facility] (2022, CHAP TRIAL), supports more aggressive bp treatment to below 140/90 (previously ,160 in CHTN) and that this does not impair fetal growth or well-being but reduces risks of pre-eclampsia,  birth, and other pregnancy morbidity.  I discussed this with the patient.  She said she runs mostly 120-130's at home with 80-90's diastolic.  If she has more consistently higher than this (>140/95), would recommend starting procardia XR 30 daily.      I recommended serial growth ultrasounds every 4 weeks  to ensure appropriate fetal growth. In addition,  fetal testing 1-2x weekly should be initiated around 32 weeks gestation.  I would recommend a 38 week delivery given CHTN.   She may require closer to 37 depending on her control closer to delivery.      Discussed importance of ambulatory monitoring at different times during the  day.  She will bring a log when she comes back next time for growth.        Summary of Plan  -Serial growth ultrasounds every 4 weeks (MFM)  -Starting at 32 weeks: at least weekly fetal  surveillance until delivery   -Will touch base with heme about anticoagulation.   -CBC's monthly at OB  -Continue Lovenox  -Delivery 38 weeks unless indicated sooner.      Follow-up: monthly for now--if doing well, may be able to do growths at OB    Thank you for the consult and opportunity to care for this patient.  Please feel free to reach out with any questions or concerns.      I spent 20 minutes caring for this patient on this date of service. This time includes time spent by me in the following activities: preparing for the visit, reviewing tests, obtaining and/or reviewing a separately obtained history, performing a medically appropriate examination and/or evaluation, counseling and educating the patient/family/caregiver and independently interpreting results and communicating that information with the patient/family/caregiver with greater than 50% spent in counseling and coordination of care.     I spent 6 minutes on the separately reported service of US imaging not included in the time used to support the E/M service also reported today.      Shruti Morton MD FACOG  Maternal Fetal Medicine-[facility]  Office: [phone]  [email or web address]

## 2025-03-03 NOTE — PROGRESS NOTES
Pt reports that she is doing well and denies vaginal bleeding, cramping, contractions or LOF at this time. Reports active fetal movement. Reviewed when to call OB office or present to L&D for evaluation with symptoms such as decreased fetal movement, vaginal bleeding, LOF or ctxs. Pt verbalized understanding. Denies HA, visual changes or epigastric pain. Denies any additional complaints at time of appointment. Next OB appointment scheduled for 3/13.      Vitals:    03/03/25 1351   BP: 111/73   Pulse: 94   Temp: 98.4 °F (36.9 °C)   SpO2: 99%

## 2025-03-03 NOTE — LETTER
2025     Jim Goldberg MD  [address]  [address]  [facility] [address]    Patient: Tuyet Bailey   YOB: 1995   Date of Visit: 3/3/2025       Dear Jim Goldberg MD    Tuyet Bailey was in my office today. Below is a copy of my note.    If you have questions, please do not hesitate to call me. I look forward to following Tuyet along with you.         Sincerely,        Shruti Morton MD      MATERNAL FETAL MEDICINE Consult Note    Dear Dr Jim Goldberg MD:    Thank you for your kind referral of Tuyet Bailey.  As you know, she is a 29 y.o.   19w2d gestation (Estimated Date of Delivery: 25). This is a consult.      Her antepartum course is complicated by:  Secondary polycythemia  CHTN    Aneuploidy Screening: low risk    HPI: Today, she denies headache, blurry vision, RUQ pain. No vaginal bleeding, no contractions.     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 outbreaks in years   • Infertility, female     IUI pregnancy   • Secondary polycythemia      Past Surgical History:   Procedure Laterality Date   • WISDOM TOOTH EXTRACTION      no excessive bleeding       Social History     Socioeconomic History   • Marital status:      Spouse name: Jose Luis  PT   Tobacco Use   • Smoking status: Never     Passive exposure: Never   • Smokeless tobacco: Never   Vaping Use   • Vaping status: Never Used   Substance and Sexual Activity   • Alcohol use: Not Currently   • Drug use: Never   • Sexual activity: Yes     Partners: Male     Birth control/protection: None     Family History   Problem Relation Age of Onset   • Diabetes Father    • Kidney cancer Father       Allergies   Allergen Reactions   • Nsaids Anaphylaxis     Lip swelling   • Sulfa Antibiotics Swelling      Current Outpatient Medications on File Prior to Visit  "  Medication Sig Dispense Refill   • doxylamine-pyridoxine ER (Bonjesta) 20-20 MG tablet controlled-release tablet Take 1 tablet by mouth 2 (Two) Times a Day. 60 tablet 3   • Enoxaparin Sodium (LOVENOX) 40 MG/0.4ML solution prefilled syringe syringe Inject 0.4 mL under the skin into the appropriate area as directed Daily. 30 mL 4   • Prenatal Vit-Fe Fumarate-FA (PRENATAL VITAMINS PO)        No current facility-administered medications on file prior to visit.        Past obstetric, gynecological, medical, surgical, family and social history reviewed.  Relevant lab work and imaging reviewed.    Review of systems  Constitutional:  denies fever, chills, malaise.   ENT/Mouth:  denies sore throat, tinnitus  Eyes: denies vision changes/pain  CV:  denies chest pain  Respiratory:  denies cough/SOB  GI:  denies N/V, diarrhea, abdominal pain.    :   denies dysuria  Skin:  denies lesions or pruritus   Neuro:  denies weakness, focal neurologic symptoms    Vitals:    25 1351   BP: 111/73   BP Location: Right arm   Patient Position: Sitting   Pulse: 94   Temp: 98.4 °F (36.9 °C)   TempSrc: Temporal   SpO2: 99%   Weight: 88 kg (194 lb)   Height: 157.5 cm (62\")       PHYSICAL EXAM   GENERAL: Not in acute distress, AAOx3, pleasant  CARDIO: regular rate and rhythm  PULM: symmetric chest rise, speaking in complete sentences without difficulty  NEURO: awake, alert and oriented to person, place, and time  ABDOMINAL: No fundal tenderness, no rebound or guarding, gravid  EXTREMITIES: no bilateral lower extremity edema/tenderness  SKIN: Warm, well-perfused      ULTRASOUND   Please view full ultrasound note on Imaging tab   Breech presentation.  Anterior placenta.  MVP 4.0 cm, which is normal.    g (AC 50%)  Normal appearing anatomy except incomplete heart views due to fetal position.    Transabdominal CL >3.5 cm, which is normal.      ASSESSMENT/COUNSELIN y.o.   19w2d gestation (Estimated Date of Delivery: " 25).     -Pregnancy  [ X ] stable  [   ] improving [  ] worsening    Diagnoses and all orders for this visit:    1. Secondary polycythemia (Primary)  Overview:  Patient's hematologist is Dr Anna Corbin  He recommended prenatal vitamins without iron unless patient is anemic and avoiding transfusions unless hemoglobin less than 7.      2. Chronic hypertension affecting pregnancy         Secondary mild polycythemia:  Previously counseled.  Pt started on ppx lovenox and to continue x 6 weeks PP.     Patient's hematologist is Dr Anna Corbin--He recommended prenatal vitamins without iron unless patient is anemic and avoiding transfusions unless hemoglobin less than 7.      She was previously tested further for JAK2 ([id number]), which was not mutated. Her EPO level was low normal at 8 and she typically manages this with 2 phlebotomies a year.  I would recommend monthly CBC and if her Hct >45 per heme--they need to see her and do a phlebotomy.      Secondary polycythemia during pregnancy can be associated with increased risk of thromboembolism, leading to obstetric complications such as fetal loss, FGR, prematurity, and maternal thromboembolism.  I suspect because of how mild this is, she  likely will not be at significantly increased risk.  She does have CHTN, also mild, so will watch carefully for preE and FGR.      CHTN  No meds, no ASA due to allergy  Previously counseled.      I explained that acceptable blood pressures in pregnancies with chronic hypertension and without renal damage are 120-140/70-90s. Newer data from the [facility] (2022, CHAP TRIAL), supports more aggressive bp treatment to below 140/90 (previously ,160 in CHTN) and that this does not impair fetal growth or well-being but reduces risks of pre-eclampsia,  birth, and other pregnancy morbidity.  I discussed this with the patient.  She said she runs mostly 120-130's at home with 80-90's diastolic.  If she has more consistently higher  than this (>140/95), would recommend starting procardia XR 30 daily.      I recommended serial growth ultrasounds every 4 weeks  to ensure appropriate fetal growth. In addition,  fetal testing 1-2x weekly should be initiated around 32 weeks gestation.  I would recommend a 38 week delivery given CHTN.   She may require closer to 37 depending on her control closer to delivery.      Discussed importance of ambulatory monitoring at different times during the day.  She will bring a log when she comes back next time for growth.        Summary of Plan  -Serial growth ultrasounds every 4 weeks (MFM)  -Starting at 32 weeks: at least weekly fetal  surveillance until delivery   -Will touch base with heme about anticoagulation.   -CBC's monthly at OB  -Continue Lovenox  -Delivery 38 weeks unless indicated sooner.      Follow-up: monthly for now--if doing well, may be able to do growths at OB    Thank you for the consult and opportunity to care for this patient.  Please feel free to reach out with any questions or concerns.      I spent 20 minutes caring for this patient on this date of service. This time includes time spent by me in the following activities: preparing for the visit, reviewing tests, obtaining and/or reviewing a separately obtained history, performing a medically appropriate examination and/or evaluation, counseling and educating the patient/family/caregiver and independently interpreting results and communicating that information with the patient/family/caregiver with greater than 50% spent in counseling and coordination of care.     I spent 6 minutes on the separately reported service of US imaging not included in the time used to support the E/M service also reported today.      Shruti Morton MD [facility]  Maternal Fetal Medicine-[facility]  Office: [phone]  [email or web address]

## 2025-03-13 ENCOUNTER — ROUTINE PRENATAL (OUTPATIENT)
Dept: OBSTETRICS AND GYNECOLOGY | Age: 30
End: 2025-03-13
Payer: COMMERCIAL

## 2025-03-13 VITALS — BODY MASS INDEX: 35.48 KG/M2 | WEIGHT: 194 LBS | DIASTOLIC BLOOD PRESSURE: 74 MMHG | SYSTOLIC BLOOD PRESSURE: 110 MMHG

## 2025-03-13 DIAGNOSIS — Z67.91 RH NEGATIVE, ANTEPARTUM: ICD-10-CM

## 2025-03-13 DIAGNOSIS — D75.1 SECONDARY POLYCYTHEMIA: ICD-10-CM

## 2025-03-13 DIAGNOSIS — O10.919 CHRONIC HYPERTENSION AFFECTING PREGNANCY: ICD-10-CM

## 2025-03-13 DIAGNOSIS — O26.899 RH NEGATIVE, ANTEPARTUM: ICD-10-CM

## 2025-03-13 DIAGNOSIS — Z3A.20 20 WEEKS GESTATION OF PREGNANCY: ICD-10-CM

## 2025-03-13 DIAGNOSIS — Z13.89 SCREENING FOR BLOOD OR PROTEIN IN URINE: Primary | ICD-10-CM

## 2025-03-13 LAB
GLUCOSE UR STRIP-MCNC: NEGATIVE MG/DL
PROT UR STRIP-MCNC: NEGATIVE MG/DL

## 2025-03-13 RX ORDER — VALACYCLOVIR HYDROCHLORIDE 1 G/1
1000 TABLET, FILM COATED ORAL 2 TIMES DAILY PRN
Qty: 30 TABLET | Refills: 1 | Status: SHIPPED | OUTPATIENT
Start: 2025-03-13

## 2025-03-13 NOTE — PROGRESS NOTES
CC- pregnancy    HPI: 29 y.o.  at 20w5d patient has had 2 episodes of scotoma.  1 was after she had looked into the sun but the one yesterday was spontaneous.  It was followed by headache.  She does not report a history of migraines although she does states she gets some headaches on and off.  Her blood pressure at home has been normal.    EXAM:  Last OB US Data (since 2024)       None          Vitals:    25 1438   BP: 110/74   Weight: 88 kg (194 lb)     Total weight gain for pregnancy:  12.2 kg (27 lb)  Normal blood pressure with no proteinuria  Doppler heart tones are positive  Weight gain for pregnancy is high  AFP testing was normal  MFM ultrasound showed breech presentation with normal-appearing anatomy except for incomplete heart views    A/P  1. Intrauterine pregnancy at 20w5d   2. Pregnancy Risk:  HIGH RISK    Diagnoses and all orders for this visit:    1. Screening for blood or protein in urine (Primary)  -     POC Urinalysis Dipstick    2. Secondary polycythemia  Overview:  Patient's hematologist is Dr Anna Corbin  He recommended prenatal vitamins without iron unless patient is anemic and avoiding transfusions unless hemoglobin less than 7.    Orders:  -     CBC (No Diff); Future    3. Chronic hypertension affecting pregnancy    4. Rh negative, antepartum    5. 20 weeks gestation of pregnancy    Other orders  -     valACYclovir (Valtrex) 1000 MG tablet; Take 1 tablet by mouth 2 (Two) Times a Day As Needed (coldsore).  Dispense: 30 tablet; Refill: 1      -----------------------  Polycythemia-check CBC today and recheck every 4 weeks.  Fetal weights every 4 weeks with MFM.  Starting at 32 weeks at least weekly fetal  surveillance until delivery.  Continue Lovenox.  Delivery at 38 weeks unless indicated sooner.  Chronic hypertension with current normal blood pressure on no medication.  Patient has had some episodes of scotoma.  It sounds somewhat like a complicated migraine.   Patient can try Tylenol and to reduce stress.  Patient does take blood pressures at home.  Instructed patient to go to labor and delivery if she has blood pressure 140/90 or above, visual changes or headache that does not go away with Tylenol.      Jim Goldberg MD  3/13/2025 17:17 EDT

## 2025-03-14 ENCOUNTER — LAB (OUTPATIENT)
Dept: LAB | Facility: HOSPITAL | Age: 30
End: 2025-03-14
Payer: COMMERCIAL

## 2025-03-14 DIAGNOSIS — D75.1 SECONDARY POLYCYTHEMIA: ICD-10-CM

## 2025-03-14 LAB
DEPRECATED RDW RBC AUTO: 41.5 FL (ref 37–54)
ERYTHROCYTE [DISTWIDTH] IN BLOOD BY AUTOMATED COUNT: 12.7 % (ref 12.3–15.4)
HCT VFR BLD AUTO: 41.5 % (ref 34–46.6)
HGB BLD-MCNC: 14.3 G/DL (ref 12–15.9)
MCH RBC QN AUTO: 30.8 PG (ref 26.6–33)
MCHC RBC AUTO-ENTMCNC: 34.5 G/DL (ref 31.5–35.7)
MCV RBC AUTO: 89.2 FL (ref 79–97)
PLATELET # BLD AUTO: 249 10*3/MM3 (ref 140–450)
PMV BLD AUTO: 10.2 FL (ref 6–12)
RBC # BLD AUTO: 4.65 10*6/MM3 (ref 3.77–5.28)
WBC NRBC COR # BLD AUTO: 11.15 10*3/MM3 (ref 3.4–10.8)

## 2025-03-14 PROCEDURE — 36415 COLL VENOUS BLD VENIPUNCTURE: CPT

## 2025-03-14 PROCEDURE — 85027 COMPLETE CBC AUTOMATED: CPT

## 2025-03-19 ENCOUNTER — TRANSCRIBE ORDERS (OUTPATIENT)
Dept: ULTRASOUND IMAGING | Facility: HOSPITAL | Age: 30
End: 2025-03-19
Payer: COMMERCIAL

## 2025-03-19 DIAGNOSIS — D75.1 SECONDARY POLYCYTHEMIA: Primary | ICD-10-CM

## 2025-03-31 ENCOUNTER — OFFICE VISIT (OUTPATIENT)
Dept: OBSTETRICS AND GYNECOLOGY | Facility: CLINIC | Age: 30
End: 2025-03-31
Payer: COMMERCIAL

## 2025-03-31 ENCOUNTER — HOSPITAL ENCOUNTER (OUTPATIENT)
Dept: ULTRASOUND IMAGING | Facility: HOSPITAL | Age: 30
Discharge: HOME | End: 2025-03-31
Admitting: OBSTETRICS & GYNECOLOGY
Payer: COMMERCIAL

## 2025-03-31 VITALS
HEIGHT: 62 IN | HEART RATE: 76 BPM | TEMPERATURE: 97.8 F | DIASTOLIC BLOOD PRESSURE: 60 MMHG | WEIGHT: 201.4 LBS | SYSTOLIC BLOOD PRESSURE: 103 MMHG | BODY MASS INDEX: 37.06 KG/M2 | OXYGEN SATURATION: 98 %

## 2025-03-31 DIAGNOSIS — D75.1 SECONDARY POLYCYTHEMIA: Primary | ICD-10-CM

## 2025-03-31 DIAGNOSIS — O10.919 CHRONIC HYPERTENSION AFFECTING PREGNANCY: ICD-10-CM

## 2025-03-31 DIAGNOSIS — Z3A.23 23 WEEKS GESTATION OF PREGNANCY: ICD-10-CM

## 2025-03-31 DIAGNOSIS — D75.1 SECONDARY POLYCYTHEMIA: ICD-10-CM

## 2025-03-31 PROCEDURE — 99213 OFFICE O/P EST LOW 20 MIN: CPT | Performed by: STUDENT IN AN ORGANIZED HEALTH CARE EDUCATION/TRAINING PROGRAM

## 2025-03-31 PROCEDURE — 76816 OB US FOLLOW-UP PER FETUS: CPT

## 2025-03-31 PROCEDURE — 76816 OB US FOLLOW-UP PER FETUS: CPT | Performed by: STUDENT IN AN ORGANIZED HEALTH CARE EDUCATION/TRAINING PROGRAM

## 2025-03-31 NOTE — PROGRESS NOTES
MATERNAL FETAL MEDICINE Consult Note    Dear Dr Jim Goldberg MD:    Thank you for your kind referral of Tuyet Bailey.  As you know, she is a 30 y.o.   23w2d gestation (Estimated Date of Delivery: 25). This is a consult.      Her antepartum course is complicated by:  Secondary polycythemia  CHTN    Aneuploidy Screening: low risk    HPI: Today, she denies headache, blurry vision, RUQ pain. No vaginal bleeding, no contractions.     Review of History:  Past Medical History:   Diagnosis Date    Anxiety     Chronic hypertension     occasional higher diastolic values- off BP meds x2 years    HSV infection     oral HSV- no outbreaks in years    Infertility, female     IUI pregnancy    Secondary polycythemia      Past Surgical History:   Procedure Laterality Date    WISDOM TOOTH EXTRACTION      no excessive bleeding       Social History     Socioeconomic History    Marital status:      Spouse name: Jose Luis  PT   Tobacco Use    Smoking status: Never     Passive exposure: Never    Smokeless tobacco: Never   Vaping Use    Vaping status: Never Used   Substance and Sexual Activity    Alcohol use: Not Currently    Drug use: Never    Sexual activity: Yes     Partners: Male     Birth control/protection: None     Family History   Problem Relation Age of Onset    Diabetes Father     Kidney cancer Father       Allergies   Allergen Reactions    Nsaids Anaphylaxis     Lip swelling    Sulfa Antibiotics Swelling      Current Outpatient Medications on File Prior to Visit   Medication Sig Dispense Refill    doxylamine-pyridoxine ER (Bonjesta) 20-20 MG tablet controlled-release tablet Take 1 tablet by mouth 2 (Two) Times a Day. 60 tablet 3    Enoxaparin Sodium (LOVENOX) 40 MG/0.4ML solution prefilled syringe syringe Inject 0.4 mL under the skin into the appropriate area as directed Daily. 30 mL 4    Prenatal Vit-Fe Fumarate-FA (PRENATAL VITAMINS PO)       valACYclovir (Valtrex) 1000 MG tablet Take 1 tablet by mouth 2 (Two)  "Times a Day As Needed (coldsore). 30 tablet 1     No current facility-administered medications on file prior to visit.        Past obstetric, gynecological, medical, surgical, family and social history reviewed.  Relevant lab work and imaging reviewed.    Review of systems  As above in HPI     Vitals:    25 1457   BP: 103/60   BP Location: Right arm   Patient Position: Sitting   Pulse: 76   Temp: 97.8 °F (36.6 °C)   TempSrc: Temporal   SpO2: 98%   Weight: 91.4 kg (201 lb 6.4 oz)   Height: 157.5 cm (62\")       PHYSICAL EXAM   General: No acute distress  Respiratory: No increased work of breathing  Cardiac: reg rate   Abdominal: Gravid, nontender  Extremities: No significant edema  Neuro/psych: Alert and oriented, appropriate mood        ULTRASOUND   Please view full ultrasound note on Imaging tab     Breech Presentation  Anterior placenta  MVP 4.4 cm which is normal  Fetal biometry is consistent with dates EFW 70%, AC 95%  The fetal anatomic survey was successfully completed and appears normal    ASSESSMENT/COUNSELIN y.o.   23w2d gestation (Estimated Date of Delivery: 25).     -Pregnancy  [ X ] stable  [   ] improving [  ] worsening    Diagnoses and all orders for this visit:    1. Secondary polycythemia (Primary)  Overview:  Patient's hematologist is Dr Anna Corbin  He recommended prenatal vitamins without iron unless patient is anemic and avoiding transfusions unless hemoglobin less than 7.      2. 23 weeks gestation of pregnancy    3. Chronic hypertension affecting pregnancy           Secondary mild polycythemia:  Previously counseled.  Pt started on ppx lovenox and to continue x 6 weeks PP.     Patient's hematologist is Dr Anna Corbin--He recommended prenatal vitamins without iron unless patient is anemic and avoiding transfusions unless hemoglobin less than 7.      She was previously tested further for JAK2 ([id number]), which was not mutated. Her EPO level was low normal at 8 and she " typically manages this with 2 phlebotomies a year.  I would recommend monthly CBC and if her Hct >45 per heme--they need to see her and do a phlebotomy.      Secondary polycythemia during pregnancy can be associated with increased risk of thromboembolism, leading to obstetric complications such as fetal loss, FGR, prematurity, and maternal thromboembolism.  I suspect because of how mild this is, she  likely will not be at significantly increased risk.  She does have CHTN, also mild, so will watch carefully for preE and FGR.      CHTN  No meds, no ASA due to allergy  Previously counseled.      I explained that acceptable blood pressures in pregnancies with chronic hypertension and without renal damage are 120-140/70-90s. Newer data from the [facility] (2022, CHAP TRIAL), supports more aggressive bp treatment to below 140/90 (previously ,160 in CHTN) and that this does not impair fetal growth or well-being but reduces risks of pre-eclampsia,  birth, and other pregnancy morbidity.  If she has more consistently higher than this (>140/95), would recommend starting procardia XR 30 daily.    We discussed that if she is persistently elevated that she should call the office as this would consider labs and initiation of medication. However, if she develops symptoms of preeclampsia or has BP >160/110 she should present to labor and delivery for immediate evaluation. She expressed understanding.     I recommended serial growth ultrasounds every 4 weeks  to ensure appropriate fetal growth. In addition,  fetal testing 1x weekly should be initiated around 32 weeks gestation.  I would recommend a 38 week delivery given CHTN and lovenox use.   She may require closer to 37 depending on her control closer to delivery.      Discussed importance of ambulatory monitoring at different times during the day.  She will bring a log when she comes back next time for growth.        Summary of Plan  -Serial growth ultrasounds  every 4 weeks (MFM)  -Starting at 32 weeks: weekly fetal  surveillance until delivery   -CBC's monthly at OB  -Continue Lovenox  -Delivery 38 weeks unless indicated sooner.      Follow-up: monthly for now--if doing well, may be able to do growths at OB    Thank you for the consult and opportunity to care for this patient.  Please feel free to reach out with any questions or concerns.      I spent 20 minutes caring for this patient on this date of service. This time includes time spent by me in the following activities: preparing for the visit, reviewing tests, obtaining and/or reviewing a separately obtained history, performing a medically appropriate examination and/or evaluation, counseling and educating the patient/family/caregiver and independently interpreting results and communicating that information with the patient/family/caregiver with greater than 50% spent in counseling and coordination of care.     I spent 4 minutes on the separately reported service of US imaging not included in the time used to support the E/M service also reported today.        Kandice Lord MD   Maternal Fetal Medicine-[facility]  Office: [phone]

## 2025-03-31 NOTE — LETTER
2025     Jim Goldberg MD  [address] [facility]  [address]  [place] [id number]    Patient: Tuyet Bailey   YOB: 1995   Date of Visit: 3/31/2025       Dear Jim Goldberg MD    Tuyet Bailey was in my office today. Below is a copy of my note.    If you have questions, please do not hesitate to call me. I look forward to following Tuyet along with you.         Sincerely,        Kandice Lord MD        CC: No Recipients    Pt reports that she is doing well and denies vaginal bleeding, cramping, contractions or LOF at this time. Reports active fetal movement. Reviewed when to call OB office or present to L&D for evaluation with symptoms such as decreased fetal movement, vaginal bleeding, LOF or ctxs. Pt verbalized understanding. Denies HA, visual changes or epigastric pain. Denies any additional complaints at time of appointment. Next OB appointment scheduled for .     Vitals:    25 1457   BP: 103/60   Pulse: 76   Temp: 97.8 °F (36.6 °C)   SpO2: 98%         MATERNAL FETAL MEDICINE Consult Note    Dear Dr Jim Goldberg MD:    Thank you for your kind referral of Tuyet Bailey.  As you know, she is a 30 y.o.   23w2d gestation (Estimated Date of Delivery: 25). This is a consult.      Her antepartum course is complicated by:  Secondary polycythemia  CHTN    Aneuploidy Screening: low risk    HPI: Today, she denies headache, blurry vision, RUQ pain. No vaginal bleeding, no contractions.     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 outbreaks in years   • Infertility, female     IUI pregnancy   • Secondary polycythemia      Past Surgical History:   Procedure Laterality Date   • WISDOM TOOTH EXTRACTION      no excessive bleeding       Social History     Socioeconomic History   • Marital status:      Spouse name: Jose Luis   "PT   Tobacco Use   • Smoking status: Never     Passive exposure: Never   • Smokeless tobacco: Never   Vaping Use   • Vaping status: Never Used   Substance and Sexual Activity   • Alcohol use: Not Currently   • Drug use: Never   • Sexual activity: Yes     Partners: Male     Birth control/protection: None     Family History   Problem Relation Age of Onset   • Diabetes Father    • Kidney cancer Father       Allergies   Allergen Reactions   • Nsaids Anaphylaxis     Lip swelling   • Sulfa Antibiotics Swelling      Current Outpatient Medications on File Prior to Visit   Medication Sig Dispense Refill   • doxylamine-pyridoxine ER (Bonjesta) 20-20 MG tablet controlled-release tablet Take 1 tablet by mouth 2 (Two) Times a Day. 60 tablet 3   • Enoxaparin Sodium (LOVENOX) 40 MG/0.4ML solution prefilled syringe syringe Inject 0.4 mL under the skin into the appropriate area as directed Daily. 30 mL 4   • Prenatal Vit-Fe Fumarate-FA (PRENATAL VITAMINS PO)      • valACYclovir (Valtrex) 1000 MG tablet Take 1 tablet by mouth 2 (Two) Times a Day As Needed (coldsore). 30 tablet 1     No current facility-administered medications on file prior to visit.        Past obstetric, gynecological, medical, surgical, family and social history reviewed.  Relevant lab work and imaging reviewed.    Review of systems  As above in HPI     Vitals:    03/31/25 1457   BP: 103/60   BP Location: Right arm   Patient Position: Sitting   Pulse: 76   Temp: 97.8 °F (36.6 °C)   TempSrc: Temporal   SpO2: 98%   Weight: 91.4 kg (201 lb 6.4 oz)   Height: 157.5 cm (62\")       PHYSICAL EXAM   General: No acute distress  Respiratory: No increased work of breathing  Cardiac: reg rate   Abdominal: Gravid, nontender  Extremities: No significant edema  Neuro/psych: Alert and oriented, appropriate mood        ULTRASOUND   Please view full ultrasound note on Imaging tab     Breech Presentation  Anterior placenta  MVP 4.4 cm which is normal  Fetal biometry is consistent " with dates EFW 70%, AC 95%  The fetal anatomic survey was successfully completed and appears normal    ASSESSMENT/COUNSELIN y.o.   23w2d gestation (Estimated Date of Delivery: 25).     -Pregnancy  [ X ] stable  [   ] improving [  ] worsening    Diagnoses and all orders for this visit:    1. Secondary polycythemia (Primary)  Overview:  Patient's hematologist is Dr Anna Corbin  He recommended prenatal vitamins without iron unless patient is anemic and avoiding transfusions unless hemoglobin less than 7.      2. 23 weeks gestation of pregnancy    3. Chronic hypertension affecting pregnancy           Secondary mild polycythemia:  Previously counseled.  Pt started on ppx lovenox and to continue x 6 weeks PP.     Patient's hematologist is Dr Anna Corbin--He recommended prenatal vitamins without iron unless patient is anemic and avoiding transfusions unless hemoglobin less than 7.      She was previously tested further for JAK2 ([id number]), which was not mutated. Her EPO level was low normal at 8 and she typically manages this with 2 phlebotomies a year.  I would recommend monthly CBC and if her Hct >45 per heme--they need to see her and do a phlebotomy.      Secondary polycythemia during pregnancy can be associated with increased risk of thromboembolism, leading to obstetric complications such as fetal loss, FGR, prematurity, and maternal thromboembolism.  I suspect because of how mild this is, she  likely will not be at significantly increased risk.  She does have CHTN, also mild, so will watch carefully for preE and FGR.      CHTN  No meds, no ASA due to allergy  Previously counseled.      I explained that acceptable blood pressures in pregnancies with chronic hypertension and without renal damage are 120-140/70-90s. Newer data from the [facility] (2022, CHAP TRIAL), supports more aggressive bp treatment to below 140/90 (previously ,160 in CHTN) and that this does not impair fetal growth or  well-being but reduces risks of pre-eclampsia,  birth, and other pregnancy morbidity.  If she has more consistently higher than this (>140/95), would recommend starting procardia XR 30 daily.    We discussed that if she is persistently elevated that she should call the office as this would consider labs and initiation of medication. However, if she develops symptoms of preeclampsia or has BP >160/110 she should present to labor and delivery for immediate evaluation. She expressed understanding.     I recommended serial growth ultrasounds every 4 weeks  to ensure appropriate fetal growth. In addition,  fetal testing 1x weekly should be initiated around 32 weeks gestation.  I would recommend a 38 week delivery given CHTN and lovenox use.   She may require closer to 37 depending on her control closer to delivery.      Discussed importance of ambulatory monitoring at different times during the day.  She will bring a log when she comes back next time for growth.        Summary of Plan  -Serial growth ultrasounds every 4 weeks (MFM)  -Starting at 32 weeks: weekly fetal  surveillance until delivery   -CBC's monthly at OB  -Continue Lovenox  -Delivery 38 weeks unless indicated sooner.      Follow-up: monthly for now--if doing well, may be able to do growths at OB    Thank you for the consult and opportunity to care for this patient.  Please feel free to reach out with any questions or concerns.      I spent 20 minutes caring for this patient on this date of service. This time includes time spent by me in the following activities: preparing for the visit, reviewing tests, obtaining and/or reviewing a separately obtained history, performing a medically appropriate examination and/or evaluation, counseling and educating the patient/family/caregiver and independently interpreting results and communicating that information with the patient/family/caregiver with greater than 50% spent in counseling and  coordination of care.     I spent 4 minutes on the separately reported service of US imaging not included in the time used to support the E/M service also reported today.        Kandice Lord MD   Maternal Fetal Medicine-[facility]  Office: [phone]

## 2025-03-31 NOTE — PROGRESS NOTES
Pt reports that she is doing well and denies vaginal bleeding, cramping, contractions or LOF at this time. Reports active fetal movement. Reviewed when to call OB office or present to L&D for evaluation with symptoms such as decreased fetal movement, vaginal bleeding, LOF or ctxs. Pt verbalized understanding. Denies HA, visual changes or epigastric pain. Denies any additional complaints at time of appointment. Next OB appointment scheduled for 4/7.     Vitals:    03/31/25 1457   BP: 103/60   Pulse: 76   Temp: 97.8 °F (36.6 °C)   SpO2: 98%

## 2025-04-02 ENCOUNTER — TELEPHONE (OUTPATIENT)
Dept: OBSTETRICS AND GYNECOLOGY | Age: 30
End: 2025-04-02
Payer: COMMERCIAL

## 2025-04-02 NOTE — TELEPHONE ENCOUNTER
Patient is scheduled to see Dr. Goldberg 4/7/25. She states she has an abscess the size of a nickel on her pubic area. She states there is no pus or discharge coming from it. Wants to know if she should be seen sooner than 4/7/25. Please advise.

## 2025-04-02 NOTE — TELEPHONE ENCOUNTER
PT HAS BEEN NOTIFIED AND VOICED UNDERSTANDING. APPT HAS BEEN MOVED UP TO FRIDAY 4/4/25 WITH DR. PATHAK

## 2025-04-04 ENCOUNTER — ROUTINE PRENATAL (OUTPATIENT)
Dept: OBSTETRICS AND GYNECOLOGY | Age: 30
End: 2025-04-04
Payer: COMMERCIAL

## 2025-04-04 ENCOUNTER — LAB (OUTPATIENT)
Dept: LAB | Facility: HOSPITAL | Age: 30
End: 2025-04-04
Payer: COMMERCIAL

## 2025-04-04 VITALS — SYSTOLIC BLOOD PRESSURE: 112 MMHG | WEIGHT: 197 LBS | BODY MASS INDEX: 36.03 KG/M2 | DIASTOLIC BLOOD PRESSURE: 74 MMHG

## 2025-04-04 DIAGNOSIS — Z67.91 RH NEGATIVE, ANTEPARTUM: ICD-10-CM

## 2025-04-04 DIAGNOSIS — O10.919 CHRONIC HYPERTENSION AFFECTING PREGNANCY: ICD-10-CM

## 2025-04-04 DIAGNOSIS — Z34.90 PREGNANCY, UNSPECIFIED GESTATIONAL AGE: ICD-10-CM

## 2025-04-04 DIAGNOSIS — D75.1 SECONDARY POLYCYTHEMIA: ICD-10-CM

## 2025-04-04 DIAGNOSIS — O26.899 RH NEGATIVE, ANTEPARTUM: ICD-10-CM

## 2025-04-04 DIAGNOSIS — Z13.89 SCREENING FOR BLOOD OR PROTEIN IN URINE: Primary | ICD-10-CM

## 2025-04-04 LAB
DEPRECATED RDW RBC AUTO: 38.7 FL (ref 37–54)
ERYTHROCYTE [DISTWIDTH] IN BLOOD BY AUTOMATED COUNT: 12 % (ref 12.3–15.4)
GLUCOSE UR STRIP-MCNC: NEGATIVE MG/DL
HCT VFR BLD AUTO: 41.3 % (ref 34–46.6)
HGB BLD-MCNC: 14.2 G/DL (ref 12–15.9)
MCH RBC QN AUTO: 30.1 PG (ref 26.6–33)
MCHC RBC AUTO-ENTMCNC: 34.4 G/DL (ref 31.5–35.7)
MCV RBC AUTO: 87.5 FL (ref 79–97)
PLATELET # BLD AUTO: 289 10*3/MM3 (ref 140–450)
PMV BLD AUTO: 10.3 FL (ref 6–12)
PROT UR STRIP-MCNC: NEGATIVE MG/DL
RBC # BLD AUTO: 4.72 10*6/MM3 (ref 3.77–5.28)
WBC NRBC COR # BLD AUTO: 12.27 10*3/MM3 (ref 3.4–10.8)

## 2025-04-04 PROCEDURE — 36415 COLL VENOUS BLD VENIPUNCTURE: CPT | Performed by: OBSTETRICS & GYNECOLOGY

## 2025-04-04 PROCEDURE — 85027 COMPLETE CBC AUTOMATED: CPT | Performed by: OBSTETRICS & GYNECOLOGY

## 2025-04-04 NOTE — PROGRESS NOTES
CC- pregnancy    HPI: 30 y.o.  at 23w6d patient has noticed bump on her right groin area.  It has gone down over the past 3 days.  No drainage.  Patient had her last CBC drawn 3 weeks ago.  She has not had a recurrence of the scotoma.  She has been checking her blood pressures at home and they have been good.  She saw MFM.  We discussed diet.  Patient is trying to do a good job but does have a sweet tooth.  She has brownies several times a week.    EXAM:  Last OB US Data (since 2024)       None          Vitals:    25 0812   BP: 112/74   Weight: 89.4 kg (197 lb)     Total weight gain for pregnancy:  13.6 kg (30 lb)  Pelvic exam there is a sebaceous cyst on the right crural region.  There is no erythema or skin break.  Doppler tones are positive and fundal height is appropriate  Weight gain for pregnancy is high at 30 pounds  No proteinuria today  MFM notes reviewed.  Baby was at the 70th percentile and abdominal circumference at the 95th percentile.  Baby was breech    A/P  1. Intrauterine pregnancy at 23w6d   2. Pregnancy Risk:  HIGH RISK    Diagnoses and all orders for this visit:    1. Screening for blood or protein in urine (Primary)  -     POC Urinalysis Dipstick    2. Secondary polycythemia  Overview:  Patient's hematologist is Dr Anna Corbin  He recommended prenatal vitamins without iron unless patient is anemic and avoiding transfusions unless hemoglobin less than 7.    Orders:  -     CBC (No Diff)    3. Rh negative, antepartum    4. Pregnancy, unspecified gestational age    5. Chronic hypertension affecting pregnancy      -----------------------  Polycythemia-recheck CBC today and every 4 weeks.  Fetal weights every 4 weeks with MFM.  Starting at 32 weeks at least weekly fetal  surveillance until delivery.  Continue Lovenox.  Delivery at 38 weeks unless indicated sooner.  Chronic hypertension with current normal blood pressures and no medications.  Scotoma have  resolved.  Sebaceous cyst.  Try warm compresses and looser clothing  Fetal weight was trending LGA with abdominal circumference at the 95th percentile and high weight gain in pregnancy.  Discussed diet in detail.      Jim Goldberg MD  4/4/2025 09:42 EDT

## 2025-04-30 ENCOUNTER — TRANSCRIBE ORDERS (OUTPATIENT)
Dept: ULTRASOUND IMAGING | Facility: HOSPITAL | Age: 30
End: 2025-04-30
Payer: COMMERCIAL

## 2025-04-30 DIAGNOSIS — D75.1 SECONDARY POLYCYTHEMIA: Primary | ICD-10-CM

## 2025-05-01 ENCOUNTER — HOSPITAL ENCOUNTER (OUTPATIENT)
Dept: ULTRASOUND IMAGING | Facility: HOSPITAL | Age: 30
Discharge: HOME | End: 2025-05-01
Admitting: OBSTETRICS & GYNECOLOGY
Payer: COMMERCIAL

## 2025-05-01 ENCOUNTER — OFFICE VISIT (OUTPATIENT)
Dept: OBSTETRICS AND GYNECOLOGY | Facility: CLINIC | Age: 30
End: 2025-05-01
Payer: COMMERCIAL

## 2025-05-01 VITALS
WEIGHT: 201.2 LBS | TEMPERATURE: 97.7 F | OXYGEN SATURATION: 100 % | SYSTOLIC BLOOD PRESSURE: 111 MMHG | BODY MASS INDEX: 36.8 KG/M2 | DIASTOLIC BLOOD PRESSURE: 74 MMHG | HEART RATE: 84 BPM

## 2025-05-01 DIAGNOSIS — D75.1 SECONDARY POLYCYTHEMIA: Primary | ICD-10-CM

## 2025-05-01 DIAGNOSIS — D75.1 SECONDARY POLYCYTHEMIA: ICD-10-CM

## 2025-05-01 DIAGNOSIS — O10.919 CHRONIC HYPERTENSION AFFECTING PREGNANCY: ICD-10-CM

## 2025-05-01 PROCEDURE — 76816 OB US FOLLOW-UP PER FETUS: CPT

## 2025-05-01 NOTE — PROGRESS NOTES
MATERNAL FETAL MEDICINE Consult Note    Dear Dr Jim Goldberg MD:    Thank you for your kind referral of Tuyet Bailey.  As you know, she is a 30 y.o.   27w5d gestation (Estimated Date of Delivery: 25). This is a consult.      Her antepartum course is complicated by:  Secondary polycythemia  CHTN    Aneuploidy Screening: low risk    HPI: Today, she denies headache, blurry vision, RUQ pain. No vaginal bleeding, no contractions. No complaints today.      Review of History:  Past Medical History:   Diagnosis Date    Anxiety     Chronic hypertension     occasional higher diastolic values- off BP meds x2 years    HSV infection     oral HSV- no outbreaks in years    Infertility, female     IUI pregnancy    Secondary polycythemia      Past Surgical History:   Procedure Laterality Date    WISDOM TOOTH EXTRACTION      no excessive bleeding       Social History     Socioeconomic History    Marital status:      Spouse name: Jose Luis  PT   Tobacco Use    Smoking status: Never     Passive exposure: Never    Smokeless tobacco: Never   Vaping Use    Vaping status: Never Used   Substance and Sexual Activity    Alcohol use: Not Currently    Drug use: Never    Sexual activity: Yes     Partners: Male     Birth control/protection: None     Family History   Problem Relation Age of Onset    Diabetes Father     Kidney cancer Father       Allergies   Allergen Reactions    Nsaids Anaphylaxis     Lip swelling    Sulfa Antibiotics Swelling      Current Outpatient Medications on File Prior to Visit   Medication Sig Dispense Refill    enoxaparin sodium (LOVENOX) 40 MG/0.4ML solution prefilled syringe syringe Inject 0.4 mL under the skin into the appropriate area as directed Daily. 30 mL 4    Prenatal Vit-Fe Fumarate-FA (PRENATAL VITAMINS PO)       doxylamine-pyridoxine ER (Bonjesta) 20-20 MG tablet controlled-release tablet Take 1 tablet by mouth 2 (Two) Times a Day. (Patient not taking: Reported on 2025) 60 tablet 3     valACYclovir (Valtrex) 1000 MG tablet Take 1 tablet by mouth 2 (Two) Times a Day As Needed (coldsore). (Patient not taking: Reported on 2025) 30 tablet 1     No current facility-administered medications on file prior to visit.        Past obstetric, gynecological, medical, surgical, family and social history reviewed.  Relevant lab work and imaging reviewed.    Review of systems  As above in HPI     Vitals:    25 1509   BP: 111/74   BP Location: Right arm   Patient Position: Sitting   Pulse: 84   Temp: 97.7 °F (36.5 °C)   TempSrc: Temporal   SpO2: 100%   Weight: 91.3 kg (201 lb 3.2 oz)         PHYSICAL EXAM   General: No acute distress  Respiratory: No increased work of breathing  Cardiac: reg rate   Abdominal: Gravid, nontender  Extremities: No significant edema  Neuro/psych: Alert and oriented, appropriate mood        ULTRASOUND   Please view full ultrasound note on Imaging tab   Cephalic presentation.  Anterior placenta.  GAMA 19.2 cm, which is normal.   EFW 1347 g (74%, AC 86%)  Normal limited follow up anatomy.      ASSESSMENT/COUNSELIN y.o.   27w5d gestation (Estimated Date of Delivery: 25).    -Pregnancy  [ X ] stable  [   ] improving [  ] worsening    Diagnoses and all orders for this visit:    1. Secondary polycythemia (Primary)  Overview:  Patient's hematologist is Dr Anna Corbin  He recommended prenatal vitamins without iron unless patient is anemic and avoiding transfusions unless hemoglobin less than 7.      2. Chronic hypertension affecting pregnancy        Secondary mild polycythemia:  Previously counseled.  Pt started on ppx lovenox and to continue x 6 weeks PP.     Patient's hematologist is Dr Anna Corbin--He recommended prenatal vitamins without iron unless patient is anemic and avoiding transfusions unless hemoglobin less than 7.      She was previously tested further for JAK2 ([id number]), which was not mutated. Her EPO level was low normal at 8 and she typically  manages this with 2 phlebotomies a year.  I would recommend monthly CBC and if her Hct >45 per heme--they need to see her and do a phlebotomy.      CHTN  No meds, no ASA due to allergy  Previously counseled.      We reviewed that if she is persistently elevated that she should call the office as this would consider labs and initiation of medication. However, if she develops symptoms of preeclampsia or has BP >160/110 she should present to labor and delivery for immediate evaluation. She expressed understanding.     I recommended serial growth ultrasounds every 4 weeks  to ensure appropriate fetal growth. In addition,  fetal testing 1x weekly should be initiated around 32 weeks gestation.  I would recommend a 38 week delivery given CHTN and lovenox use.   She may require closer to 37 depending on her control closer to delivery.        She asked about fetal weight.  Weight is normal and appropriate (upper end of normal) but she has normal fluid.  She has glucola test next week and will follow up but hopeful it will be normal.      Summary of Plan  -Serial growth ultrasounds every 4 weeks (MFM)  -Starting at 32 weeks: weekly fetal  surveillance until delivery--every other week between OB and MFM   -CBC's monthly at OB--have been <45  -Continue Lovenox  -Delivery 38 weeks unless indicated sooner.      Follow-up: monthly    Thank you for the consult and opportunity to care for this patient.  Please feel free to reach out with any questions or concerns.      I spent 20 minutes caring for this patient on this date of service. This time includes time spent by me in the following activities: preparing for the visit, reviewing tests, obtaining and/or reviewing a separately obtained history, performing a medically appropriate examination and/or evaluation, counseling and educating the patient/family/caregiver and independently interpreting results and communicating that information with the  patient/family/caregiver with greater than 50% spent in counseling and coordination of care.     I spent 6 minutes on the separately reported service of US imaging not included in the time used to support the E/M service also reported today.      I confirm that all copied/forwarded documentation in this record has been carefully reviewed, updated as necessary, and accurately reflects the patient's current status and plan of care.      Shruti Morton MD [facility] [place]  Maternal Fetal Medicine-[facility]  Office: [phone]  [email or web address]

## 2025-05-01 NOTE — LETTER
May 2, 2025     Jim Goldberg MD  [address]  [address]  [facility] [address]    Patient: Tuyet Bailey   YOB: 1995   Date of Visit: 2025       Dear Jim Goldberg MD    Tuyet Bailey was in my office today. Below is a copy of my note.    If you have questions, please do not hesitate to call me. I look forward to following Tuyet along with you.         Sincerely,        Shruti Morton MD      MATERNAL FETAL MEDICINE Consult Note    Dear Dr Jim Goldberg MD:    Thank you for your kind referral of Tuyet Bailey.  As you know, she is a 30 y.o.   27w5d gestation (Estimated Date of Delivery: 25). This is a consult.      Her antepartum course is complicated by:  Secondary polycythemia  CHTN    Aneuploidy Screening: low risk    HPI: Today, she denies headache, blurry vision, RUQ pain. No vaginal bleeding, no contractions. No complaints today.      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 outbreaks in years   • Infertility, female     IUI pregnancy   • Secondary polycythemia      Past Surgical History:   Procedure Laterality Date   • WISDOM TOOTH EXTRACTION      no excessive bleeding       Social History     Socioeconomic History   • Marital status:      Spouse name: Jose Luis  PT   Tobacco Use   • Smoking status: Never     Passive exposure: Never   • Smokeless tobacco: Never   Vaping Use   • Vaping status: Never Used   Substance and Sexual Activity   • Alcohol use: Not Currently   • Drug use: Never   • Sexual activity: Yes     Partners: Male     Birth control/protection: None     Family History   Problem Relation Age of Onset   • Diabetes Father    • Kidney cancer Father       Allergies   Allergen Reactions   • Nsaids Anaphylaxis     Lip swelling   • Sulfa Antibiotics Swelling      Current Outpatient Medications on File Prior  to Visit   Medication Sig Dispense Refill   • enoxaparin sodium (LOVENOX) 40 MG/0.4ML solution prefilled syringe syringe Inject 0.4 mL under the skin into the appropriate area as directed Daily. 30 mL 4   • Prenatal Vit-Fe Fumarate-FA (PRENATAL VITAMINS PO)      • doxylamine-pyridoxine ER (Bonjesta) 20-20 MG tablet controlled-release tablet Take 1 tablet by mouth 2 (Two) Times a Day. (Patient not taking: Reported on 2025) 60 tablet 3   • valACYclovir (Valtrex) 1000 MG tablet Take 1 tablet by mouth 2 (Two) Times a Day As Needed (coldsore). (Patient not taking: Reported on 2025) 30 tablet 1     No current facility-administered medications on file prior to visit.        Past obstetric, gynecological, medical, surgical, family and social history reviewed.  Relevant lab work and imaging reviewed.    Review of systems  As above in HPI     Vitals:    25 1509   BP: 111/74   BP Location: Right arm   Patient Position: Sitting   Pulse: 84   Temp: 97.7 °F (36.5 °C)   TempSrc: Temporal   SpO2: 100%   Weight: 91.3 kg (201 lb 3.2 oz)         PHYSICAL EXAM   General: No acute distress  Respiratory: No increased work of breathing  Cardiac: reg rate   Abdominal: Gravid, nontender  Extremities: No significant edema  Neuro/psych: Alert and oriented, appropriate mood        ULTRASOUND   Please view full ultrasound note on Imaging tab   Cephalic presentation.  Anterior placenta.  GAMA 19.2 cm, which is normal.   EFW 1347 g (74%, AC 86%)  Normal limited follow up anatomy.      ASSESSMENT/COUNSELIN y.o.   27w5d gestation (Estimated Date of Delivery: 25).    -Pregnancy  [ X ] stable  [   ] improving [  ] worsening    Diagnoses and all orders for this visit:    1. Secondary polycythemia (Primary)  Overview:  Patient's hematologist is Dr Anna Corbin  He recommended prenatal vitamins without iron unless patient is anemic and avoiding transfusions unless hemoglobin less than 7.      2. Chronic hypertension  affecting pregnancy        Secondary mild polycythemia:  Previously counseled.  Pt started on ppx lovenox and to continue x 6 weeks PP.     Patient's hematologist is Dr Anna Corbin--He recommended prenatal vitamins without iron unless patient is anemic and avoiding transfusions unless hemoglobin less than 7.      She was previously tested further for JAK2 ([id number]), which was not mutated. Her EPO level was low normal at 8 and she typically manages this with 2 phlebotomies a year.  I would recommend monthly CBC and if her Hct >45 per heme--they need to see her and do a phlebotomy.      CHTN  No meds, no ASA due to allergy  Previously counseled.      We reviewed that if she is persistently elevated that she should call the office as this would consider labs and initiation of medication. However, if she develops symptoms of preeclampsia or has BP >160/110 she should present to labor and delivery for immediate evaluation. She expressed understanding.     I recommended serial growth ultrasounds every 4 weeks  to ensure appropriate fetal growth. In addition,  fetal testing 1x weekly should be initiated around 32 weeks gestation.  I would recommend a 38 week delivery given CHTN and lovenox use.   She may require closer to 37 depending on her control closer to delivery.        She asked about fetal weight.  Weight is normal and appropriate (upper end of normal) but she has normal fluid.  She has glucola test next week and will follow up but hopeful it will be normal.      Summary of Plan  -Serial growth ultrasounds every 4 weeks (MFM)  -Starting at 32 weeks: weekly fetal  surveillance until delivery--every other week between OB and M   -CBC's monthly at OB--have been <45  -Continue Lovenox  -Delivery 38 weeks unless indicated sooner.      Follow-up: monthly    Thank you for the consult and opportunity to care for this patient.  Please feel free to reach out with any questions or concerns.      I  spent 20 minutes caring for this patient on this date of service. This time includes time spent by me in the following activities: preparing for the visit, reviewing tests, obtaining and/or reviewing a separately obtained history, performing a medically appropriate examination and/or evaluation, counseling and educating the patient/family/caregiver and independently interpreting results and communicating that information with the patient/family/caregiver with greater than 50% spent in counseling and coordination of care.     I spent 6 minutes on the separately reported service of US imaging not included in the time used to support the E/M service also reported today.      I confirm that all copied/forwarded documentation in this record has been carefully reviewed, updated as necessary, and accurately reflects the patient's current status and plan of care.      Shruti Morton MD FACOG  Maternal Fetal Medicine-[facility]  Office: [phone]  [email or web address]

## 2025-05-01 NOTE — PROGRESS NOTES
Pt reports that she is doing well and denies vaginal bleeding, cramping, contractions or LOF at this time. Reports active fetal movement. Notes occasional abdominal tightness with increased activity, denies consistency and reports improvement with rest. Reviewed when to call OB office or present to L&D for evaluation with symptoms such as decreased fetal movement, vaginal bleeding, LOF or ctxs. Pt verbalized understanding. Denies HA, visual changes or epigastric pain. Notes light swelling in ankles, denies consistency and reports improvement with rest. Denies any additional complaints at time of appointment. Next OB appointment scheduled for 5/9.    Vitals:    05/01/25 1509   BP: 111/74   Pulse: 84   Temp: 97.7 °F (36.5 °C)   SpO2: 100%

## 2025-05-06 ENCOUNTER — TELEPHONE (OUTPATIENT)
Dept: OBSTETRICS AND GYNECOLOGY | Age: 30
End: 2025-05-06
Payer: COMMERCIAL

## 2025-05-06 NOTE — TELEPHONE ENCOUNTER
Pt called requesting to a CBC lab ordered at [facility]. Stated Dr. Goldberg wanted done monthly. Pt would like to complete before her visit with Dr. Goldberg on 5/9/25

## 2025-05-09 ENCOUNTER — ROUTINE PRENATAL (OUTPATIENT)
Dept: OBSTETRICS AND GYNECOLOGY | Age: 30
End: 2025-05-09
Payer: COMMERCIAL

## 2025-05-09 VITALS — SYSTOLIC BLOOD PRESSURE: 122 MMHG | BODY MASS INDEX: 36.76 KG/M2 | DIASTOLIC BLOOD PRESSURE: 78 MMHG | WEIGHT: 201 LBS

## 2025-05-09 DIAGNOSIS — Z13.89 SCREENING FOR BLOOD OR PROTEIN IN URINE: ICD-10-CM

## 2025-05-09 DIAGNOSIS — Z13.1 SCREENING FOR DIABETES MELLITUS: ICD-10-CM

## 2025-05-09 DIAGNOSIS — Z3A.28 28 WEEKS GESTATION OF PREGNANCY: ICD-10-CM

## 2025-05-09 DIAGNOSIS — D75.1 SECONDARY POLYCYTHEMIA: ICD-10-CM

## 2025-05-09 DIAGNOSIS — Z67.91 RH NEGATIVE, ANTEPARTUM: Primary | ICD-10-CM

## 2025-05-09 DIAGNOSIS — O26.899 RH NEGATIVE, ANTEPARTUM: Primary | ICD-10-CM

## 2025-05-09 DIAGNOSIS — O10.919 CHRONIC HYPERTENSION AFFECTING PREGNANCY: ICD-10-CM

## 2025-05-09 DIAGNOSIS — Z13.0 SCREENING FOR IRON DEFICIENCY ANEMIA: ICD-10-CM

## 2025-05-09 LAB
GLUCOSE UR STRIP-MCNC: NEGATIVE MG/DL
PROT UR STRIP-MCNC: NEGATIVE MG/DL

## 2025-05-09 NOTE — PROGRESS NOTES
CC- pregnancy    HPI: 30 y.o.  at 28w6d patient is feeling good fetal movements.  She is checking her blood pressures and they have been normal except for a couple of occasions when she has had a diastolic that was at 90.  On recheck it was normal.  She did discuss this with Dr. Morton.  She has tried to make good dietary changes.    EXAM:  Last OB US Data (since 10/21/2024)       None          Vitals:    25 0828   BP: 122/78   Weight: 91.2 kg (201 lb)     Total weight gain for pregnancy:  15.4 kg (34 lb)  Weight gain for pregnancy is high  MFM ultrasound showed an estimated fetal weight at the 74th percentile.  Abdominal circumference at the 86 percentile.  Doppler heart tones are positive and fundal height is appropriate  No proteinuria today and normal blood pressure    A/P  1. Intrauterine pregnancy at 28w6d   2. Pregnancy Risk:  HIGH RISK    Diagnoses and all orders for this visit:    1. Rh negative, antepartum (Primary)  -     Antibody Screen    2. Screening for iron deficiency anemia  -     CBC (No Diff)    3. Screening for diabetes mellitus  -     Gestational Screen 1 Hr ([facility])    4. 28 weeks gestation of pregnancy  -     Treponema pallidum AB w/Reflex RPR    5. Screening for blood or protein in urine  -     POC Urinalysis Dipstick    6. Chronic hypertension affecting pregnancy    7. Secondary polycythemia  Overview:  Patient's hematologist is Dr Anna Corbin  He recommended prenatal vitamins without iron unless patient is anemic and avoiding transfusions unless hemoglobin less than 7.      Other orders  -     Rhogam Immune Globulin Immunization      -----------------------  Polycythemia-recheck CBC today and continue to check every 4 weeks.  Fetal weights every 4 weeks with MFM.  Alternate BPP starting at 32 weeks with MFM.  Continue Lovenox and switch to heparin at about 35 weeks.  Delivery planned at 38 weeks or sooner if indicated  Chronic hypertension with current normal blood  pressure and no proteinuria  Third trimester labs today  Patient would like to delay her Tdap till next visit  Rh--RhoGAM today      Jim Goldberg MD  5/9/2025 08:58 EDT

## 2025-05-12 LAB
BLD GP AB SCN SERPL QL: NEGATIVE
ERYTHROCYTE [DISTWIDTH] IN BLOOD BY AUTOMATED COUNT: 12.5 % (ref 12.3–15.4)
GLUCOSE 1H P 50 G GLC PO SERPL-MCNC: 124 MG/DL (ref 65–139)
HCT VFR BLD AUTO: 38.2 % (ref 34–46.6)
HGB BLD-MCNC: 12.7 G/DL (ref 12–15.9)
MCH RBC QN AUTO: 29.7 PG (ref 26.6–33)
MCHC RBC AUTO-ENTMCNC: 33.2 G/DL (ref 31.5–35.7)
MCV RBC AUTO: 89.3 FL (ref 79–97)
PLATELET # BLD AUTO: 214 10*3/MM3 (ref 140–450)
RBC # BLD AUTO: 4.28 10*6/MM3 (ref 3.77–5.28)
TREPONEMA PALLIDUM IGG+IGM AB [PRESENCE] IN SERUM OR PLASMA BY IMMUNOASSAY: NON REACTIVE
WBC # BLD AUTO: 9.29 10*3/MM3 (ref 3.4–10.8)

## 2025-05-19 ENCOUNTER — ROUTINE PRENATAL (OUTPATIENT)
Dept: OBSTETRICS AND GYNECOLOGY | Age: 30
End: 2025-05-19
Payer: COMMERCIAL

## 2025-05-19 VITALS — WEIGHT: 204 LBS | SYSTOLIC BLOOD PRESSURE: 118 MMHG | DIASTOLIC BLOOD PRESSURE: 74 MMHG | BODY MASS INDEX: 37.31 KG/M2

## 2025-05-19 DIAGNOSIS — Z3A.30 30 WEEKS GESTATION OF PREGNANCY: ICD-10-CM

## 2025-05-19 DIAGNOSIS — O10.919 CHRONIC HYPERTENSION AFFECTING PREGNANCY: ICD-10-CM

## 2025-05-19 DIAGNOSIS — O26.899 RH NEGATIVE, ANTEPARTUM: ICD-10-CM

## 2025-05-19 DIAGNOSIS — Z13.89 SCREENING FOR BLOOD OR PROTEIN IN URINE: Primary | ICD-10-CM

## 2025-05-19 DIAGNOSIS — Z67.91 RH NEGATIVE, ANTEPARTUM: ICD-10-CM

## 2025-05-19 LAB
GLUCOSE UR STRIP-MCNC: NEGATIVE MG/DL
PROT UR STRIP-MCNC: NEGATIVE MG/DL

## 2025-05-19 NOTE — PROGRESS NOTES
CC- pregnancy    HPI: 30 y.o.  at 30w2d patient would like to redo her 1 hour glucose tolerance test.  She feels like she drank so much water during the hour that the test was diluted and she does not trust the results.  She is feeling good fetal movements.  She is checking her blood pressure.  She did have 1 episode where the diastolic was slightly above 90 on 1 occasion.    EXAM:  Last OB US Data (since 10/31/2024)       None          Vitals:    25 1401   BP: 118/74   Weight: 92.5 kg (204 lb)     Total weight gain for pregnancy:  16.8 kg (37 lb)  Weight gain for pregnancy is high  Doppler heart tones are positive and fundal height is appropriate  No proteinuria today  Normal blood pressure today      1 hour glucose tolerance test was 124    A/P  1. Intrauterine pregnancy at 30w2d   2. Pregnancy Risk:  HIGH RISK    Diagnoses and all orders for this visit:    1. Screening for blood or protein in urine (Primary)  -     POC Urinalysis Dipstick    2. 30 weeks gestation of pregnancy    3. Rh negative, antepartum    4. Chronic hypertension affecting pregnancy    Other orders  -     Tdap Vaccine => 8yo IM (BOOSTRIX/ADACEL)      -----------------------  Polycythemia-recheck CBC in 2 weeks.  Next fetal weight with MFM next week.  See me in 2 weeks and start BPP's.  Continue Lovenox and switch to heparin at 35 weeks.  Delivery planned at 38 weeks or sooner if indicated    Chronic hypertension with current normal blood pressures and no proteinuria.  Patient will continue to check blood sugars and come in for any symptoms    Tdap today    Patient would like to repeat her 1 hour glucose tolerance test.  I do not think this is necessary but patient would feel better if she did since it was close to 130.  She will come in sometime this week    Rh--RhoGAM has been given      Jim Goldberg MD  2025 15:51 EDT

## 2025-05-21 DIAGNOSIS — Z13.1 SCREENING FOR DIABETES MELLITUS (DM): Primary | ICD-10-CM

## 2025-05-27 ENCOUNTER — LAB (OUTPATIENT)
Dept: OBSTETRICS AND GYNECOLOGY | Age: 30
End: 2025-05-27
Payer: COMMERCIAL

## 2025-05-27 DIAGNOSIS — Z13.1 SCREENING FOR DIABETES MELLITUS (DM): Primary | ICD-10-CM

## 2025-05-28 ENCOUNTER — RESULTS FOLLOW-UP (OUTPATIENT)
Dept: OBSTETRICS AND GYNECOLOGY | Age: 30
End: 2025-05-28
Payer: COMMERCIAL

## 2025-05-28 LAB
GLUCOSE 1H P 100 G GLC PO SERPL-MCNC: 130 MG/DL (ref 70–179)
GLUCOSE 2H P 100 G GLC PO SERPL-MCNC: 126 MG/DL (ref 70–154)
GLUCOSE 3H P 100 G GLC PO SERPL-MCNC: 98 MG/DL (ref 70–139)
GLUCOSE P FAST SERPL-MCNC: 81 MG/DL (ref 70–94)
Lab: NORMAL

## 2025-05-28 NOTE — TELEPHONE ENCOUNTER
LM notifying pt of results:  glucose tolerance test is normal. All 4 values were in normal range. Patient does not have gestational diabetes.

## 2025-05-29 ENCOUNTER — OFFICE VISIT (OUTPATIENT)
Dept: OBSTETRICS AND GYNECOLOGY | Facility: CLINIC | Age: 30
End: 2025-05-29
Payer: COMMERCIAL

## 2025-05-29 ENCOUNTER — HOSPITAL ENCOUNTER (OUTPATIENT)
Dept: ULTRASOUND IMAGING | Facility: HOSPITAL | Age: 30
Discharge: HOME | End: 2025-05-29
Admitting: OBSTETRICS & GYNECOLOGY
Payer: COMMERCIAL

## 2025-05-29 VITALS
WEIGHT: 208.38 LBS | SYSTOLIC BLOOD PRESSURE: 107 MMHG | DIASTOLIC BLOOD PRESSURE: 70 MMHG | TEMPERATURE: 97.8 F | HEIGHT: 62 IN | OXYGEN SATURATION: 98 % | HEART RATE: 75 BPM | BODY MASS INDEX: 38.35 KG/M2

## 2025-05-29 DIAGNOSIS — O10.919 CHRONIC HYPERTENSION AFFECTING PREGNANCY: ICD-10-CM

## 2025-05-29 DIAGNOSIS — Z3A.31 31 WEEKS GESTATION OF PREGNANCY: ICD-10-CM

## 2025-05-29 DIAGNOSIS — D75.1 SECONDARY POLYCYTHEMIA: ICD-10-CM

## 2025-05-29 DIAGNOSIS — D75.1 SECONDARY POLYCYTHEMIA: Primary | ICD-10-CM

## 2025-05-29 PROCEDURE — 76819 FETAL BIOPHYS PROFIL W/O NST: CPT

## 2025-05-29 PROCEDURE — 76816 OB US FOLLOW-UP PER FETUS: CPT

## 2025-05-29 NOTE — PROGRESS NOTES
MATERNAL FETAL MEDICINE Consult Note    Dear Dr Jim Goldberg MD:    Thank you for your kind referral of Tuyet Bailey.  As you know, she is a 30 y.o.   31w5d gestation (Estimated Date of Delivery: 25). This is a consult.      Her antepartum course is complicated by:  Secondary polycythemia  CHTN    Aneuploidy Screening: low risk    HPI: Today, she denies headache, blurry vision, RUQ pain. No vaginal bleeding, no contractions. No complaints today.      Review of History:  Past Medical History:   Diagnosis Date    Anxiety     Chronic hypertension     occasional higher diastolic values- off BP meds x2 years    HSV infection     oral HSV- no outbreaks in years    Infertility, female     IUI pregnancy    Secondary polycythemia      Past Surgical History:   Procedure Laterality Date    WISDOM TOOTH EXTRACTION      no excessive bleeding       Social History     Socioeconomic History    Marital status:      Spouse name: Jose Luis  PT   Tobacco Use    Smoking status: Never     Passive exposure: Never    Smokeless tobacco: Never   Vaping Use    Vaping status: Never Used   Substance and Sexual Activity    Alcohol use: Not Currently    Drug use: Never    Sexual activity: Yes     Partners: Male     Birth control/protection: None     Family History   Problem Relation Age of Onset    Diabetes Father     Kidney cancer Father       Allergies   Allergen Reactions    Nsaids Anaphylaxis     Lip swelling    Sulfa Antibiotics Swelling      Current Outpatient Medications on File Prior to Visit   Medication Sig Dispense Refill    doxylamine (UNISOM) 25 MG tablet Take 1 tablet by mouth At Night As Needed for Sleep.      enoxaparin sodium (LOVENOX) 40 MG/0.4ML solution prefilled syringe syringe Inject 0.4 mL under the skin into the appropriate area as directed Daily. 30 mL 4    Prenatal Vit-Fe Fumarate-FA (PRENATAL VITAMINS PO)       valACYclovir (Valtrex) 1000 MG tablet Take 1 tablet by mouth 2 (Two) Times a Day As Needed  "(coldsore). 30 tablet 1     No current facility-administered medications on file prior to visit.        Past obstetric, gynecological, medical, surgical, family and social history reviewed.  Relevant lab work and imaging reviewed.    Review of systems  As above in HPI     Vitals:    25 1503   BP: 107/70   BP Location: Right arm   Patient Position: Sitting   Pulse: 75   Temp: 97.8 °F (36.6 °C)   TempSrc: Temporal   SpO2: 98%   Weight: 94.5 kg (208 lb 6 oz)   Height: 157.5 cm (62\")         PHYSICAL EXAM   General: No acute distress  Respiratory: No increased work of breathing  Cardiac: reg rate   Abdominal: Gravid, nontender  Extremities: No significant edema  Neuro/psych: Alert and oriented, appropriate mood        ULTRASOUND   Please view full ultrasound note on Imaging tab   Cephalic presentation  Anterior placenta  Fetal biometry is consistent with dates EFW 82%, AC 95%  GAMA 21.8 cm which is normal  BPP     ASSESSMENT/COUNSELIN y.o.   31w5d gestation (Estimated Date of Delivery: 25).    -Pregnancy  [ X ] stable  [   ] improving [  ] worsening    Diagnoses and all orders for this visit:    1. Secondary polycythemia (Primary)  Overview:  Patient's hematologist is Dr Anna Corbin  He recommended prenatal vitamins without iron unless patient is anemic and avoiding transfusions unless hemoglobin less than 7.      2. Chronic hypertension affecting pregnancy    3. 31 weeks gestation of pregnancy          Secondary mild polycythemia:  Previously counseled.  Pt started on ppx lovenox and to continue x 6 weeks PP.     Patient's hematologist is Dr Anna Corbin--He recommended prenatal vitamins without iron unless patient is anemic and avoiding transfusions unless hemoglobin less than 7.      She was previously tested further for JAK2 ([id number]), which was not mutated. Her EPO level was low normal at 8 and she typically manages this with 2 phlebotomies a year.  I would recommend monthly CBC and " if her Hct >45 per heme--they need to see her and do a phlebotomy.      Last Hct 38.2 on     CHTN  No meds, no ASA due to allergy  Previously counseled.      Reviewed blood pressure logs in patient's planner. Overall, they were normal. She had one elevated into the 140s systolic that quickly resolved.     We reviewed that if she is persistently elevated that she should call the office as this would consider labs and initiation of medication. However, if she develops symptoms of preeclampsia or has BP >160/110 she should present to labor and delivery for immediate evaluation. She expressed understanding.     I recommended serial growth ultrasounds every 4 weeks  to ensure appropriate fetal growth. In addition,  fetal testing 1x weekly should be initiated around 32 weeks gestation.  I would recommend a 38 week delivery given CHTN and lovenox use.   She may require closer to 37 depending on her control closer to delivery.          Summary of Plan  -Serial growth ultrasounds every 4 weeks (MFM)  -Starting at 32 weeks: weekly fetal  surveillance until delivery--every other week between OB and MFM   -CBC's monthly at OB--have been <45  -Continue Lovenox  -Delivery 38 weeks unless indicated sooner.      Follow-up: Every other week for  surveillance     Thank you for the consult and opportunity to care for this patient.  Please feel free to reach out with any questions or concerns.    I spent 18 minutes caring for this patient on this date of service. This time includes time spent by me in the following activities: preparing for the visit, reviewing tests, obtaining and/or reviewing a separately obtained history, performing a medically appropriate examination and/or evaluation, counseling and educating the patient/family/caregiver and independently interpreting results and communicating that information with the patient/family/caregiver with greater than 50% spent in counseling and coordination  of care.         I spent 3 minutes on the separately reported service of US imaging not included in the time used to support the E/M service also reported today.      Kandice Lord MD   Maternal Fetal Medicine-[facility]  Office: [phone]

## 2025-05-29 NOTE — PROGRESS NOTES
Pt reports that she is doing well and denies vaginal bleeding, or LOF at this time. Reports some cramping after overexertion that eases with rest. Reports active fetal movement. Reviewed when to call OB office or present to L&D for evaluation with symptoms such as decreased fetal movement, vaginal bleeding, LOF or ctxs. Pt verbalized understanding. Denies HA or epigastric pain. Notes she did notice floaters that resolved after taking tylenol. Denies any additional complaints at time of appointment. Next OB appointment scheduled for 6/2.        Vitals:    05/29/25 1503   BP: 107/70   Pulse: 75   Temp: 97.8 °F (36.6 °C)   SpO2: 98%

## 2025-05-29 NOTE — LETTER
May 30, 2025     Jim Goldberg MD  [address]  [address]  [place] [id number]    Patient: Tuyet Bailey   YOB: 1995   Date of Visit: 2025       Dear Jim Goldberg MD    Tuyet Bailey was in my office today. Below is a copy of my note.    If you have questions, please do not hesitate to call me. I look forward to following Tuyet along with you.         Sincerely,        Kandice Lord MD        CC: No Recipients    Pt reports that she is doing well and denies vaginal bleeding, or LOF at this time. Reports some cramping after overexertion that eases with rest. Reports active fetal movement. Reviewed when to call OB office or present to L&D for evaluation with symptoms such as decreased fetal movement, vaginal bleeding, LOF or ctxs. Pt verbalized understanding. Denies HA or epigastric pain. Notes she did notice floaters that resolved after taking tylenol. Denies any additional complaints at time of appointment. Next OB appointment scheduled for .        Vitals:    25 1503   BP: 107/70   Pulse: 75   Temp: 97.8 °F (36.6 °C)   SpO2: 98%         MATERNAL FETAL MEDICINE Consult Note    Dear Dr Jim Goldberg MD:    Thank you for your kind referral of Tuyet Bailey.  As you know, she is a 30 y.o.   31w5d gestation (Estimated Date of Delivery: 25). This is a consult.      Her antepartum course is complicated by:  Secondary polycythemia  CHTN    Aneuploidy Screening: low risk    HPI: Today, she denies headache, blurry vision, RUQ pain. No vaginal bleeding, no contractions. No complaints today.      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 outbreaks in years   • Infertility, female     IUI pregnancy   • Secondary polycythemia      Past Surgical History:   Procedure Laterality Date   • WISDOM TOOTH EXTRACTION      no  "excessive bleeding       Social History     Socioeconomic History   • Marital status:      Spouse name: Jose Luis  PT   Tobacco Use   • Smoking status: Never     Passive exposure: Never   • Smokeless tobacco: Never   Vaping Use   • Vaping status: Never Used   Substance and Sexual Activity   • Alcohol use: Not Currently   • Drug use: Never   • Sexual activity: Yes     Partners: Male     Birth control/protection: None     Family History   Problem Relation Age of Onset   • Diabetes Father    • Kidney cancer Father       Allergies   Allergen Reactions   • Nsaids Anaphylaxis     Lip swelling   • Sulfa Antibiotics Swelling      Current Outpatient Medications on File Prior to Visit   Medication Sig Dispense Refill   • doxylamine (UNISOM) 25 MG tablet Take 1 tablet by mouth At Night As Needed for Sleep.     • enoxaparin sodium (LOVENOX) 40 MG/0.4ML solution prefilled syringe syringe Inject 0.4 mL under the skin into the appropriate area as directed Daily. 30 mL 4   • Prenatal Vit-Fe Fumarate-FA (PRENATAL VITAMINS PO)      • valACYclovir (Valtrex) 1000 MG tablet Take 1 tablet by mouth 2 (Two) Times a Day As Needed (coldsore). 30 tablet 1     No current facility-administered medications on file prior to visit.        Past obstetric, gynecological, medical, surgical, family and social history reviewed.  Relevant lab work and imaging reviewed.    Review of systems  As above in HPI     Vitals:    05/29/25 1503   BP: 107/70   BP Location: Right arm   Patient Position: Sitting   Pulse: 75   Temp: 97.8 °F (36.6 °C)   TempSrc: Temporal   SpO2: 98%   Weight: 94.5 kg (208 lb 6 oz)   Height: 157.5 cm (62\")         PHYSICAL EXAM   General: No acute distress  Respiratory: No increased work of breathing  Cardiac: reg rate   Abdominal: Gravid, nontender  Extremities: No significant edema  Neuro/psych: Alert and oriented, appropriate mood        ULTRASOUND   Please view full ultrasound note on Imaging tab   Cephalic " presentation  Anterior placenta  Fetal biometry is consistent with dates EFW 82%, AC 95%  GAMA 21.8 cm which is normal  BPP     ASSESSMENT/COUNSELIN y.o.   31w5d gestation (Estimated Date of Delivery: 25).    -Pregnancy  [ X ] stable  [   ] improving [  ] worsening    Diagnoses and all orders for this visit:    1. Secondary polycythemia (Primary)  Overview:  Patient's hematologist is Dr Anna Corbin  He recommended prenatal vitamins without iron unless patient is anemic and avoiding transfusions unless hemoglobin less than 7.      2. Chronic hypertension affecting pregnancy    3. 31 weeks gestation of pregnancy          Secondary mild polycythemia:  Previously counseled.  Pt started on ppx lovenox and to continue x 6 weeks PP.     Patient's hematologist is Dr Anna Corbin--He recommended prenatal vitamins without iron unless patient is anemic and avoiding transfusions unless hemoglobin less than 7.      She was previously tested further for JAK2 ([id number]), which was not mutated. Her EPO level was low normal at 8 and she typically manages this with 2 phlebotomies a year.  I would recommend monthly CBC and if her Hct >45 per heme--they need to see her and do a phlebotomy.      Last Hct 38.2 on     CHTN  No meds, no ASA due to allergy  Previously counseled.      Reviewed blood pressure logs in patient's planner. Overall, they were normal. She had one elevated into the 140s systolic that quickly resolved.     We reviewed that if she is persistently elevated that she should call the office as this would consider labs and initiation of medication. However, if she develops symptoms of preeclampsia or has BP >160/110 she should present to labor and delivery for immediate evaluation. She expressed understanding.     I recommended serial growth ultrasounds every 4 weeks  to ensure appropriate fetal growth. In addition,  fetal testing 1x weekly should be initiated around 32 weeks gestation.   I would recommend a 38 week delivery given CHTN and lovenox use.   She may require closer to 37 depending on her control closer to delivery.          Summary of Plan  -Serial growth ultrasounds every 4 weeks (MFM)  -Starting at 32 weeks: weekly fetal  surveillance until delivery--every other week between OB and MFM   -CBC's monthly at OB--have been <45  -Continue Lovenox  -Delivery 38 weeks unless indicated sooner.      Follow-up: Every other week for  surveillance     Thank you for the consult and opportunity to care for this patient.  Please feel free to reach out with any questions or concerns.    I spent 18 minutes caring for this patient on this date of service. This time includes time spent by me in the following activities: preparing for the visit, reviewing tests, obtaining and/or reviewing a separately obtained history, performing a medically appropriate examination and/or evaluation, counseling and educating the patient/family/caregiver and independently interpreting results and communicating that information with the patient/family/caregiver with greater than 50% spent in counseling and coordination of care.         I spent 3 minutes on the separately reported service of US imaging not included in the time used to support the E/M service also reported today.      Kandice Lord MD   Maternal Fetal Medicine-[facility]  Office: [phone]

## 2025-06-02 ENCOUNTER — ROUTINE PRENATAL (OUTPATIENT)
Dept: OBSTETRICS AND GYNECOLOGY | Age: 30
End: 2025-06-02
Payer: COMMERCIAL

## 2025-06-02 VITALS — SYSTOLIC BLOOD PRESSURE: 108 MMHG | BODY MASS INDEX: 37.49 KG/M2 | WEIGHT: 205 LBS | DIASTOLIC BLOOD PRESSURE: 64 MMHG

## 2025-06-02 DIAGNOSIS — O26.899 RH NEGATIVE, ANTEPARTUM: ICD-10-CM

## 2025-06-02 DIAGNOSIS — D75.1 SECONDARY POLYCYTHEMIA: ICD-10-CM

## 2025-06-02 DIAGNOSIS — Z67.91 RH NEGATIVE, ANTEPARTUM: ICD-10-CM

## 2025-06-02 DIAGNOSIS — O10.919 CHRONIC HYPERTENSION AFFECTING PREGNANCY: ICD-10-CM

## 2025-06-02 DIAGNOSIS — Z3A.32 32 WEEKS GESTATION OF PREGNANCY: ICD-10-CM

## 2025-06-02 DIAGNOSIS — Z13.89 SCREENING FOR BLOOD OR PROTEIN IN URINE: Primary | ICD-10-CM

## 2025-06-02 LAB
GLUCOSE UR STRIP-MCNC: NEGATIVE MG/DL
PROT UR STRIP-MCNC: NEGATIVE MG/DL

## 2025-06-02 NOTE — PROGRESS NOTES
CC- pregnancy    HPI: 30 y.o.  at 32w2d patient is feeling good fetal movements.  She did have an episode of seeing some white lights.  She checked her blood pressure and it was okay.  Patient wanted to check her 1 hour again at last visit.  She did have an elevation but her 3-hour was normal.  She is trying to watch her diet.    EXAM:  Last OB US Data (since 2024)         Value Time User    BPP  8/8 2025  2:30 PM Jim Goldberg MD    GAMA  19 cm 2025  2:30 PM Jim Goldberg MD          Vitals:    25 1406   BP: 108/64   Weight: 93 kg (205 lb)     Total weight gain for pregnancy:  17.2 kg (38 lb)  Fetal weight with MFM was at the 82nd percentile.  Donel circumference of the 95th percentile estimated fetal weight was 4 pounds 11 ounces  Biophysical profile today is 8 out of 8.  GAMA is normal at 19 and baby is vertex    A/P  1. Intrauterine pregnancy at 32w2d   2. Pregnancy Risk:  HIGH RISK    Diagnoses and all orders for this visit:    1. Screening for blood or protein in urine (Primary)  -     POC Urinalysis Dipstick    2. 32 weeks gestation of pregnancy    3. Rh negative, antepartum    4. Chronic hypertension affecting pregnancy    5. Secondary polycythemia  Overview:  Patient's hematologist is Dr Anna Corbin  He recommended prenatal vitamins without iron unless patient is anemic and avoiding transfusions unless hemoglobin less than 7.    Orders:  -     CBC (No Diff)      -----------------------  Polycythemia-recheck CBC today.  Continue fetal weights every 4 weeks with MFM.  Alternate BPP's with MFM and here.  Next BPP here will be in 2 weeks.  Continue Lovenox and switch to heparin at about 35 weeks.  Delivery was recommended at 38 weeks or sooner if indicated.  Chronic hypertension with normal current blood pressures and no proteinuria-patient will continue to check blood pressures at home.  Rh--RhoGAM has been given  Recommend gio Goldberg MD  2025 14:31 EDT

## 2025-06-03 LAB
ERYTHROCYTE [DISTWIDTH] IN BLOOD BY AUTOMATED COUNT: 12.8 % (ref 12.3–15.4)
HCT VFR BLD AUTO: 36.5 % (ref 34–46.6)
HGB BLD-MCNC: 12.3 G/DL (ref 12–15.9)
MCH RBC QN AUTO: 29.6 PG (ref 26.6–33)
MCHC RBC AUTO-ENTMCNC: 33.7 G/DL (ref 31.5–35.7)
MCV RBC AUTO: 87.7 FL (ref 79–97)
PLATELET # BLD AUTO: 195 10*3/MM3 (ref 140–450)
RBC # BLD AUTO: 4.16 10*6/MM3 (ref 3.77–5.28)
WBC # BLD AUTO: 9.53 10*3/MM3 (ref 3.4–10.8)

## 2025-06-10 DIAGNOSIS — D75.1 SECONDARY POLYCYTHEMIA: Primary | ICD-10-CM

## 2025-06-12 ENCOUNTER — OFFICE VISIT (OUTPATIENT)
Dept: OBSTETRICS AND GYNECOLOGY | Facility: CLINIC | Age: 30
End: 2025-06-12
Payer: COMMERCIAL

## 2025-06-12 ENCOUNTER — HOSPITAL ENCOUNTER (OUTPATIENT)
Dept: ULTRASOUND IMAGING | Facility: HOSPITAL | Age: 30
Discharge: HOME | End: 2025-06-12
Admitting: NURSE PRACTITIONER
Payer: COMMERCIAL

## 2025-06-12 VITALS
DIASTOLIC BLOOD PRESSURE: 85 MMHG | BODY MASS INDEX: 38.56 KG/M2 | HEART RATE: 101 BPM | HEIGHT: 62 IN | OXYGEN SATURATION: 100 % | WEIGHT: 209.56 LBS | TEMPERATURE: 98.6 F | SYSTOLIC BLOOD PRESSURE: 126 MMHG

## 2025-06-12 DIAGNOSIS — D75.1 SECONDARY POLYCYTHEMIA: Primary | ICD-10-CM

## 2025-06-12 DIAGNOSIS — O10.919 CHRONIC HYPERTENSION AFFECTING PREGNANCY: ICD-10-CM

## 2025-06-12 DIAGNOSIS — D75.1 SECONDARY POLYCYTHEMIA: ICD-10-CM

## 2025-06-12 PROCEDURE — 76819 FETAL BIOPHYS PROFIL W/O NST: CPT

## 2025-06-12 NOTE — LETTER
2025     Jim Goldberg MD  [address]  [address]  [place] [address]    Patient: Tuyet Bailey   YOB: 1995   Date of Visit: 2025       Dear Jim Goldberg MD    Tuyet Bailey was in my office today. Below is a copy of my note.    If you have questions, please do not hesitate to call me. I look forward to following Tuyet along with you.         Sincerely,        Shruti Morton MD      MATERNAL FETAL MEDICINE Consult Note    Dear Dr Jim Goldberg MD:    Thank you for your kind referral of Tuyet Bailey.  As you know, she is a 30 y.o.   33w5d gestation (Estimated Date of Delivery: 25). This is a consult.      Her antepartum course is complicated by:  Secondary polycythemia  CHTN    Aneuploidy Screening: low risk    HPI: Today, she denies headache, blurry vision, RUQ pain. No vaginal bleeding, no contractions. No complaints today.      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 outbreaks in years   • Infertility, female     IUI pregnancy   • Secondary polycythemia      Past Surgical History:   Procedure Laterality Date   • WISDOM TOOTH EXTRACTION      no excessive bleeding       Social History     Socioeconomic History   • Marital status:      Spouse name: Jose Luis  PT   Tobacco Use   • Smoking status: Never     Passive exposure: Never   • Smokeless tobacco: Never   Vaping Use   • Vaping status: Never Used   Substance and Sexual Activity   • Alcohol use: Not Currently   • Drug use: Never   • Sexual activity: Yes     Partners: Male     Birth control/protection: None     Family History   Problem Relation Age of Onset   • Diabetes Father    • Kidney cancer Father       Allergies   Allergen Reactions   • Nsaids Anaphylaxis     Lip swelling   • Sulfa Antibiotics Swelling      Current Outpatient Medications on File  "Prior to Visit   Medication Sig Dispense Refill   • doxylamine (UNISOM) 25 MG tablet Take 1 tablet by mouth At Night As Needed for Sleep.     • enoxaparin sodium (LOVENOX) 40 MG/0.4ML solution prefilled syringe syringe Inject 0.4 mL under the skin into the appropriate area as directed Daily. 30 mL 4   • Prenatal Vit-Fe Fumarate-FA (PRENATAL VITAMINS PO)      • valACYclovir (Valtrex) 1000 MG tablet Take 1 tablet by mouth 2 (Two) Times a Day As Needed (coldsore). 30 tablet 1     No current facility-administered medications on file prior to visit.        Past obstetric, gynecological, medical, surgical, family and social history reviewed.  Relevant lab work and imaging reviewed.    Review of systems  As above in HPI     Vitals:    25 1502   BP: 126/85   BP Location: Right arm   Patient Position: Sitting   Pulse: 101   Temp: 98.6 °F (37 °C)   TempSrc: Oral   SpO2: 100%   Weight: 95.1 kg (209 lb 9 oz)   Height: 157.5 cm (62\")       PHYSICAL EXAM   General: No acute distress  Respiratory: No increased work of breathing  Cardiac: reg rate   Abdominal: Gravid, nontender  Extremities: No significant edema  Neuro/psych: Alert and oriented, appropriate mood    ULTRASOUND   Please view full ultrasound note on Imaging tab   Cephalic presentation.  Anterior placenta.  GAMA 23 cm, which is high normal.   BPP 8/8.    ASSESSMENT/COUNSELIN y.o.   33w5d gestation (Estimated Date of Delivery: 25).    -Pregnancy  [ X ] stable  [   ] improving [  ] worsening    Diagnoses and all orders for this visit:    1. Secondary polycythemia (Primary)  Overview:  Patient's hematologist is Dr Anna Corbin  He recommended prenatal vitamins without iron unless patient is anemic and avoiding transfusions unless hemoglobin less than 7.      2. Chronic hypertension affecting pregnancy        Secondary mild polycythemia:  Previously counseled.  Pt started on ppx lovenox and to continue x 6 weeks PP.  Her Hct is actually within the " normal range at 36.      Patient's hematologist is Dr Castillo at [place]--He recommended prenatal vitamins without iron unless patient is anemic and avoiding transfusions unless hemoglobin less than 7.      She was previously tested further for JAK2 ([id number]), which was not mutated. Her EPO level was low normal at 8 and she typically manages this with 2 phlebotomies a year.  I would recommend monthly CBC and if her Hct >45 per heme--they need to see her and do a phlebotomy.      Last Hct 36.5 on     CHTN  No meds, no ASA due to allergy  Previously counseled.      Reviewed blood pressure logs in patient's planner. Overall, they were normal. She had a few 90's diastolic but they were few and far between.  Discussed if she is elevated higher than her baseline (120-130/80-90), she should come and be seen in hospital.  We reviewed preeclampsia symptoms, as well.      If she develops symptoms of preeclampsia or has BP >160/110 she should present to labor and delivery for immediate evaluation. She understands.      She asked about IOL date, and we discussed that given her stability, as long as her BP stays normal, I would recommend delivery 38-39.0.  I think pushing to 39 would be okay if she continues to clinically do as well as she is right now.      I recommended serial growth ultrasounds every 4 weeks  to ensure appropriate fetal growth. In addition,  fetal testing 1x weekly should be initiated around 32 weeks gestation.       Summary of Plan  -Serial growth ultrasounds every 4 weeks (MFM)  -Starting at 32 weeks: weekly fetal  surveillance until delivery--every other week between OB and MFM   -CBC's monthly at OB--have been <45  -Continue Lovenox  -Delivery 38-39 weeks unless indicated sooner.      Follow-up: Every other week for  surveillance     Thank you for the consult and opportunity to care for this patient.  Please feel free to reach out with any questions or concerns.      I spent  20 minutes caring for this patient on this date of service. This time includes time spent by me in the following activities: preparing for the visit, reviewing tests, obtaining and/or reviewing a separately obtained history, performing a medically appropriate examination and/or evaluation, counseling and educating the patient/family/caregiver and independently interpreting results and communicating that information with the patient/family/caregiver with greater than 50% spent in counseling and coordination of care.     I confirm that all copied/forwarded documentation in this record has been carefully reviewed, updated as necessary, and accurately reflects the patient's current status and plan of care.      I spent 4 minutes on the separately reported service of US imaging not included in the time used to support the E/M service also reported today.      Shruti Morton MD FACOG  Maternal Fetal Medicine-[facility]  Office: [phone]  [email or web address]

## 2025-06-12 NOTE — PROGRESS NOTES
MATERNAL FETAL MEDICINE Consult Note    Dear Dr Jim Goldberg MD:    Thank you for your kind referral of Tuyet Bailey.  As you know, she is a 30 y.o.   33w5d gestation (Estimated Date of Delivery: 25). This is a consult.      Her antepartum course is complicated by:  Secondary polycythemia  CHTN    Aneuploidy Screening: low risk    HPI: Today, she denies headache, blurry vision, RUQ pain. No vaginal bleeding, no contractions. No complaints today.      Review of History:  Past Medical History:   Diagnosis Date    Anxiety     Chronic hypertension     occasional higher diastolic values- off BP meds x2 years    HSV infection     oral HSV- no outbreaks in years    Infertility, female     IUI pregnancy    Secondary polycythemia      Past Surgical History:   Procedure Laterality Date    WISDOM TOOTH EXTRACTION      no excessive bleeding       Social History     Socioeconomic History    Marital status:      Spouse name: Jose Luis  PT   Tobacco Use    Smoking status: Never     Passive exposure: Never    Smokeless tobacco: Never   Vaping Use    Vaping status: Never Used   Substance and Sexual Activity    Alcohol use: Not Currently    Drug use: Never    Sexual activity: Yes     Partners: Male     Birth control/protection: None     Family History   Problem Relation Age of Onset    Diabetes Father     Kidney cancer Father       Allergies   Allergen Reactions    Nsaids Anaphylaxis     Lip swelling    Sulfa Antibiotics Swelling      Current Outpatient Medications on File Prior to Visit   Medication Sig Dispense Refill    doxylamine (UNISOM) 25 MG tablet Take 1 tablet by mouth At Night As Needed for Sleep.      enoxaparin sodium (LOVENOX) 40 MG/0.4ML solution prefilled syringe syringe Inject 0.4 mL under the skin into the appropriate area as directed Daily. 30 mL 4    Prenatal Vit-Fe Fumarate-FA (PRENATAL VITAMINS PO)       valACYclovir (Valtrex) 1000 MG tablet Take 1 tablet by mouth 2 (Two) Times a Day As Needed  "(coldsore). 30 tablet 1     No current facility-administered medications on file prior to visit.        Past obstetric, gynecological, medical, surgical, family and social history reviewed.  Relevant lab work and imaging reviewed.    Review of systems  As above in HPI     Vitals:    25 1502   BP: 126/85   BP Location: Right arm   Patient Position: Sitting   Pulse: 101   Temp: 98.6 °F (37 °C)   TempSrc: Oral   SpO2: 100%   Weight: 95.1 kg (209 lb 9 oz)   Height: 157.5 cm (62\")       PHYSICAL EXAM   General: No acute distress  Respiratory: No increased work of breathing  Cardiac: reg rate   Abdominal: Gravid, nontender  Extremities: No significant edema  Neuro/psych: Alert and oriented, appropriate mood    ULTRASOUND   Please view full ultrasound note on Imaging tab   Cephalic presentation.  Anterior placenta.  GAMA 23 cm, which is high normal.   BPP 8/8.    ASSESSMENT/COUNSELIN y.o.   33w5d gestation (Estimated Date of Delivery: 25).    -Pregnancy  [ X ] stable  [   ] improving [  ] worsening    Diagnoses and all orders for this visit:    1. Secondary polycythemia (Primary)  Overview:  Patient's hematologist is Dr Anna Corbin  He recommended prenatal vitamins without iron unless patient is anemic and avoiding transfusions unless hemoglobin less than 7.      2. Chronic hypertension affecting pregnancy        Secondary mild polycythemia:  Previously counseled.  Pt started on ppx lovenox and to continue x 6 weeks PP.  Her Hct is actually within the normal range at 36.      Patient's hematologist is Dr Castillo at [place]--He recommended prenatal vitamins without iron unless patient is anemic and avoiding transfusions unless hemoglobin less than 7.      She was previously tested further for JAK2 ([id number]), which was not mutated. Her EPO level was low normal at 8 and she typically manages this with 2 phlebotomies a year.  I would recommend monthly CBC and if her Hct >45 per heme--they need to see " her and do a phlebotomy.      Last Hct 36.5 on     CHTN  No meds, no ASA due to allergy  Previously counseled.      Reviewed blood pressure logs in patient's planner. Overall, they were normal. She had a few 90's diastolic but they were few and far between.  Discussed if she is elevated higher than her baseline (120-130/80-90), she should come and be seen in hospital.  We reviewed preeclampsia symptoms, as well.      If she develops symptoms of preeclampsia or has BP >160/110 she should present to labor and delivery for immediate evaluation. She understands.      She asked about IOL date, and we discussed that given her stability, as long as her BP stays normal, I would recommend delivery 38-39.0.  I think pushing to 39 would be okay if she continues to clinically do as well as she is right now.      I recommended serial growth ultrasounds every 4 weeks  to ensure appropriate fetal growth. In addition,  fetal testing 1x weekly should be initiated around 32 weeks gestation.       Summary of Plan  -Serial growth ultrasounds every 4 weeks (MFM)  -Starting at 32 weeks: weekly fetal  surveillance until delivery--every other week between OB and MFM   -CBC's monthly at OB--have been <45  -Continue Lovenox  -Delivery 38-39 weeks unless indicated sooner.      Follow-up: Every other week for  surveillance     Thank you for the consult and opportunity to care for this patient.  Please feel free to reach out with any questions or concerns.      I spent 20 minutes caring for this patient on this date of service. This time includes time spent by me in the following activities: preparing for the visit, reviewing tests, obtaining and/or reviewing a separately obtained history, performing a medically appropriate examination and/or evaluation, counseling and educating the patient/family/caregiver and independently interpreting results and communicating that information with the patient/family/caregiver  with greater than 50% spent in counseling and coordination of care.     I confirm that all copied/forwarded documentation in this record has been carefully reviewed, updated as necessary, and accurately reflects the patient's current status and plan of care.      I spent 4 minutes on the separately reported service of US imaging not included in the time used to support the E/M service also reported today.      Shruti Morton MD [facility]  Maternal Fetal Medicine-[facility]  Office: [phone]  [email or web address]

## 2025-06-12 NOTE — PROGRESS NOTES
Pt reports that she is doing well and denies vaginal bleeding, or LOF at this time. Notes irregular [place], denies consistency. Reports active fetal movement. Reviewed when to call OB office or present to L&D for evaluation with symptoms such as decreased fetal movement, vaginal bleeding, LOF or ctxs. Pt verbalized understanding. Denies HA, visual changes or epigastric pain. Denies any additional complaints at time of appointment. Next OB appointment scheduled for 6/16.        Vitals:    06/12/25 1502   BP: 126/85   Pulse: 101   Temp: 98.6 °F (37 °C)   SpO2: 100%

## 2025-06-16 ENCOUNTER — ROUTINE PRENATAL (OUTPATIENT)
Dept: OBSTETRICS AND GYNECOLOGY | Age: 30
End: 2025-06-16
Payer: COMMERCIAL

## 2025-06-16 VITALS — BODY MASS INDEX: 38.23 KG/M2 | WEIGHT: 209 LBS | SYSTOLIC BLOOD PRESSURE: 120 MMHG | DIASTOLIC BLOOD PRESSURE: 82 MMHG

## 2025-06-16 DIAGNOSIS — Z67.91 RH NEGATIVE, ANTEPARTUM: ICD-10-CM

## 2025-06-16 DIAGNOSIS — O10.919 CHRONIC HYPERTENSION AFFECTING PREGNANCY: ICD-10-CM

## 2025-06-16 DIAGNOSIS — Z3A.34 34 WEEKS GESTATION OF PREGNANCY: ICD-10-CM

## 2025-06-16 DIAGNOSIS — O26.899 RH NEGATIVE, ANTEPARTUM: ICD-10-CM

## 2025-06-16 DIAGNOSIS — D75.1 SECONDARY POLYCYTHEMIA: ICD-10-CM

## 2025-06-16 DIAGNOSIS — Z13.89 SCREENING FOR BLOOD OR PROTEIN IN URINE: Primary | ICD-10-CM

## 2025-06-16 LAB
GLUCOSE UR STRIP-MCNC: NEGATIVE MG/DL
PROT UR STRIP-MCNC: ABNORMAL MG/DL

## 2025-06-16 RX ORDER — HEPARIN SODIUM 5000 [USP'U]/ML
5000 INJECTION, SOLUTION INTRAVENOUS; SUBCUTANEOUS EVERY 12 HOURS SCHEDULED
Qty: 60 ML | Refills: 0 | Status: SHIPPED | OUTPATIENT
Start: 2025-06-16 | End: 2025-07-17

## 2025-06-16 NOTE — PROGRESS NOTES
CC- pregnancy    HPI: 30 y.o.  at 34w2d patient is feeling good fetal movements.  She does have some pelvic pressure but no regular painful contractions.  Patient is checking blood pressures at home    EXAM:  Last OB US Data (since 2024)         Value Time User    BPP  8/8 2025  3:53 PM Jim Goldberg MD    GAMA  19 cm 2025  3:53 PM Jim Goldberg MD          Vitals:    25 1516   BP: 120/82   Weight: 94.8 kg (209 lb)     Total weight gain for pregnancy:  19.1 kg (42 lb)  Biophysical profile is 8 out of 8.  GAMA is normal at 19 and baby is vertex  Trace proteinuria today    A/P  1. Intrauterine pregnancy at 34w2d   2. Pregnancy Risk:  HIGH RISK    Diagnoses and all orders for this visit:    1. Screening for blood or protein in urine (Primary)  -     POC Urinalysis Dipstick    2. 34 weeks gestation of pregnancy    3. Rh negative, antepartum    4. Chronic hypertension affecting pregnancy    5. Secondary polycythemia  Overview:  Patient's hematologist is Dr Anna Corbin  He recommended prenatal vitamins without iron unless patient is anemic and avoiding transfusions unless hemoglobin less than 7.      Other orders  -     Heparin Sodium, Porcine, (heparin, porcine,) 5000 UNIT/ML injection; Inject 1 mL under the skin into the appropriate area as directed Every 12 (Twelve) Hours for 30 days.  Dispense: 60 mL; Refill: 0      -----------------------  Polycythemia-recheck CBC at 36 weeks.  Last hematocrit was good at 36.  Continue weekly BPP's and kick counts.  Changed to heparin at 35 weeks.  Prescription was sent to pharmacy today for 5000 units of heparin every 12.  Timing of delivery-maternal-fetal medicine discussed between 38 to 39 weeks.  I will be out of town on vacation.  Discussed I can arrange with one of the other physicians.  Chronic hypertension with normal current blood pressures on no medications.  Patient will continue to check her blood pressures at home and watch for signs and  symptoms of preeclampsia.  Rh--RhoGAM has been given        Jim Goldberg MD  6/16/2025 15:54 EDT

## 2025-06-23 ENCOUNTER — TELEPHONE (OUTPATIENT)
Dept: OBSTETRICS AND GYNECOLOGY | Facility: CLINIC | Age: 30
End: 2025-06-23
Payer: COMMERCIAL

## 2025-06-23 ENCOUNTER — LAB (OUTPATIENT)
Dept: LAB | Facility: HOSPITAL | Age: 30
End: 2025-06-23
Payer: COMMERCIAL

## 2025-06-23 DIAGNOSIS — O10.919 CHRONIC HYPERTENSION AFFECTING PREGNANCY: Primary | ICD-10-CM

## 2025-06-23 DIAGNOSIS — O10.919 CHRONIC HYPERTENSION AFFECTING PREGNANCY: ICD-10-CM

## 2025-06-23 LAB
ALBUMIN SERPL-MCNC: 3.4 G/DL (ref 3.5–5.2)
ALBUMIN/GLOB SERPL: 1.2 G/DL
ALP SERPL-CCNC: 157 U/L (ref 39–117)
ALT SERPL W P-5'-P-CCNC: 10 U/L (ref 1–33)
ANION GAP SERPL CALCULATED.3IONS-SCNC: 10.5 MMOL/L (ref 5–15)
AST SERPL-CCNC: 18 U/L (ref 1–32)
BASOPHILS # BLD AUTO: 0.05 10*3/MM3 (ref 0–0.2)
BASOPHILS NFR BLD AUTO: 0.5 % (ref 0–1.5)
BILIRUB SERPL-MCNC: 0.3 MG/DL (ref 0–1.2)
BUN SERPL-MCNC: 6.6 MG/DL (ref 6–20)
BUN/CREAT SERPL: 10.5 (ref 7–25)
CALCIUM SPEC-SCNC: 9.3 MG/DL (ref 8.6–10.5)
CHLORIDE SERPL-SCNC: 105 MMOL/L (ref 98–107)
CO2 SERPL-SCNC: 23.5 MMOL/L (ref 22–29)
CREAT SERPL-MCNC: 0.63 MG/DL (ref 0.57–1)
CREAT UR-MCNC: 37.4 MG/DL
DEPRECATED RDW RBC AUTO: 40.3 FL (ref 37–54)
EGFRCR SERPLBLD CKD-EPI 2021: 123.3 ML/MIN/1.73
EOSINOPHIL # BLD AUTO: 0.13 10*3/MM3 (ref 0–0.4)
EOSINOPHIL NFR BLD AUTO: 1.3 % (ref 0.3–6.2)
ERYTHROCYTE [DISTWIDTH] IN BLOOD BY AUTOMATED COUNT: 13.5 % (ref 12.3–15.4)
GLOBULIN UR ELPH-MCNC: 2.8 GM/DL
GLUCOSE SERPL-MCNC: 77 MG/DL (ref 65–99)
HCT VFR BLD AUTO: 36.1 % (ref 34–46.6)
HGB BLD-MCNC: 12.7 G/DL (ref 12–15.9)
IMM GRANULOCYTES # BLD AUTO: 0.05 10*3/MM3 (ref 0–0.05)
IMM GRANULOCYTES NFR BLD AUTO: 0.5 % (ref 0–0.5)
LDH SERPL-CCNC: 162 U/L (ref 135–214)
LYMPHOCYTES # BLD AUTO: 2.04 10*3/MM3 (ref 0.7–3.1)
LYMPHOCYTES NFR BLD AUTO: 19.7 % (ref 19.6–45.3)
MCH RBC QN AUTO: 29.2 PG (ref 26.6–33)
MCHC RBC AUTO-ENTMCNC: 35.2 G/DL (ref 31.5–35.7)
MCV RBC AUTO: 83 FL (ref 79–97)
MONOCYTES # BLD AUTO: 0.97 10*3/MM3 (ref 0.1–0.9)
MONOCYTES NFR BLD AUTO: 9.4 % (ref 5–12)
NEUTROPHILS NFR BLD AUTO: 68.6 % (ref 42.7–76)
NEUTROPHILS NFR BLD AUTO: 7.1 10*3/MM3 (ref 1.7–7)
NRBC BLD AUTO-RTO: 0 /100 WBC (ref 0–0.2)
PLATELET # BLD AUTO: 211 10*3/MM3 (ref 140–450)
PMV BLD AUTO: 10.2 FL (ref 6–12)
POTASSIUM SERPL-SCNC: 3.6 MMOL/L (ref 3.5–5.2)
PROT ?TM UR-MCNC: 6.3 MG/DL
PROT SERPL-MCNC: 6.2 G/DL (ref 6–8.5)
PROT/CREAT UR: 168.4 MG/G CREA (ref 0–200)
RBC # BLD AUTO: 4.35 10*6/MM3 (ref 3.77–5.28)
SODIUM SERPL-SCNC: 139 MMOL/L (ref 136–145)
URATE SERPL-MCNC: 4.3 MG/DL (ref 2.4–5.7)
WBC NRBC COR # BLD AUTO: 10.34 10*3/MM3 (ref 3.4–10.8)

## 2025-06-23 PROCEDURE — 82570 ASSAY OF URINE CREATININE: CPT

## 2025-06-23 PROCEDURE — 80053 COMPREHEN METABOLIC PANEL: CPT

## 2025-06-23 PROCEDURE — 85025 COMPLETE CBC W/AUTO DIFF WBC: CPT

## 2025-06-23 PROCEDURE — 84550 ASSAY OF BLOOD/URIC ACID: CPT

## 2025-06-23 PROCEDURE — 83615 LACTATE (LD) (LDH) ENZYME: CPT

## 2025-06-23 PROCEDURE — 84156 ASSAY OF PROTEIN URINE: CPT

## 2025-06-23 PROCEDURE — 36415 COLL VENOUS BLD VENIPUNCTURE: CPT

## 2025-06-23 NOTE — TELEPHONE ENCOUNTER
Returned pt call regarding her elevated blood pressures. Pt blood pressure logs given to [facility] provider for review. [facility] provider recommends pt go to outpatient lab for labs. Pt denies any symptoms of headache, vision changes, or epigastric pain. Pt states she feels fine but was just concerned of the elevation in readings.

## 2025-06-26 ENCOUNTER — OFFICE VISIT (OUTPATIENT)
Dept: OBSTETRICS AND GYNECOLOGY | Facility: CLINIC | Age: 30
End: 2025-06-26
Payer: COMMERCIAL

## 2025-06-26 ENCOUNTER — HOSPITAL ENCOUNTER (OUTPATIENT)
Dept: ULTRASOUND IMAGING | Facility: HOSPITAL | Age: 30
Discharge: HOME | End: 2025-06-26
Admitting: OBSTETRICS & GYNECOLOGY
Payer: COMMERCIAL

## 2025-06-26 VITALS
OXYGEN SATURATION: 100 % | HEART RATE: 74 BPM | BODY MASS INDEX: 39.6 KG/M2 | SYSTOLIC BLOOD PRESSURE: 122 MMHG | HEIGHT: 62 IN | DIASTOLIC BLOOD PRESSURE: 88 MMHG | WEIGHT: 215.19 LBS | TEMPERATURE: 98.7 F

## 2025-06-26 DIAGNOSIS — O10.919 CHRONIC HYPERTENSION AFFECTING PREGNANCY: Primary | ICD-10-CM

## 2025-06-26 DIAGNOSIS — D75.1 SECONDARY POLYCYTHEMIA: ICD-10-CM

## 2025-06-26 PROCEDURE — 76816 OB US FOLLOW-UP PER FETUS: CPT

## 2025-06-26 PROCEDURE — 76819 FETAL BIOPHYS PROFIL W/O NST: CPT

## 2025-06-26 NOTE — PROGRESS NOTES
Pt reports that she is doing well and denies vaginal bleeding,  or LOF at this time. Notes occasional irregular cramping, denies consistency. Reports active fetal movement. Reviewed when to call OB office or present to L&D for evaluation with symptoms such as decreased fetal movement, vaginal bleeding, LOF or ctxs. Pt verbalized understanding. Denies HA, visual changes or epigastric pain. Denies any additional complaints at time of appointment. Next OB appointment scheduled for 6/30.        Vitals:    06/26/25 1525   BP: 127/97   Pulse: 74   Temp: 98.7 °F (37.1 °C)   SpO2: 100%

## 2025-06-26 NOTE — LETTER
2025     Jim Goldberg MD  [address]  [address]  [facility] [address]    Patient: Tuyet Bailey   YOB: 1995   Date of Visit: 2025       Dear Jim Goldberg MD    Tuyet Bailey was in my office today. Below is a copy of my note.    If you have questions, please do not hesitate to call me. I look forward to following Tuyet along with you.         Sincerely,        Shruti Morton MD      MATERNAL FETAL MEDICINE Consult Note    Dear Dr Jim Goldberg MD:    Thank you for your kind referral of Tuyet Bailey.  As you know, she is a 30 y.o.   35w5d gestation (Estimated Date of Delivery: 25). This is a consult.      Her antepartum course is complicated by:  Secondary polycythemia  CHTN--increasing diastolics    Aneuploidy Screening: low risk    HPI: Today, she denies headache, blurry vision, RUQ pain. No vaginal bleeding, no contractions. No complaints today.      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 outbreaks in years   • Infertility, female     IUI pregnancy   • Secondary polycythemia      Past Surgical History:   Procedure Laterality Date   • WISDOM TOOTH EXTRACTION      no excessive bleeding       Social History     Socioeconomic History   • Marital status:      Spouse name: Jose Luis  PT   Tobacco Use   • Smoking status: Never     Passive exposure: Never   • Smokeless tobacco: Never   Vaping Use   • Vaping status: Never Used   Substance and Sexual Activity   • Alcohol use: Not Currently   • Drug use: Never   • Sexual activity: Yes     Partners: Male     Birth control/protection: None     Family History   Problem Relation Age of Onset   • Diabetes Father    • Kidney cancer Father       Allergies   Allergen Reactions   • Nsaids Anaphylaxis     Lip swelling   • Sulfa Antibiotics Swelling      Current Outpatient  "Medications on File Prior to Visit   Medication Sig Dispense Refill   • doxylamine (UNISOM) 25 MG tablet Take 1 tablet by mouth At Night As Needed for Sleep.     • enoxaparin sodium (LOVENOX) 40 MG/0.4ML solution prefilled syringe syringe Inject 0.4 mL under the skin into the appropriate area as directed Daily. 30 mL 4   • Prenatal Vit-Fe Fumarate-FA (PRENATAL VITAMINS PO)      • valACYclovir (Valtrex) 1000 MG tablet Take 1 tablet by mouth 2 (Two) Times a Day As Needed (coldsore). 30 tablet 1   • Heparin Sodium, Porcine, (heparin, porcine,) 5000 UNIT/ML injection Inject 1 mL under the skin into the appropriate area as directed Every 12 (Twelve) Hours for 30 days. (Patient not taking: Reported on 2025) 60 mL 0     No current facility-administered medications on file prior to visit.        Past obstetric, gynecological, medical, surgical, family and social history reviewed.  Relevant lab work and imaging reviewed.    Review of systems  As above in HPI     Vitals:    25 1525 25 1530   BP: 127/97 122/88   BP Location: Left arm Left arm   Patient Position: Sitting Sitting   Pulse: 74    Temp: 98.7 °F (37.1 °C)    TempSrc: Oral    SpO2: 100%    Weight: 97.6 kg (215 lb 3 oz)    Height: 157.5 cm (62\")        PHYSICAL EXAM   General: No acute distress  Respiratory: No increased work of breathing  Cardiac: reg rate   Abdominal: Gravid, nontender  Extremities: No significant edema  Neuro/psych: Alert and oriented, appropriate mood    ULTRASOUND   Please view full ultrasound note on Imaging tab   Cephalic presentation.  Anterior placenta.  GAMA 22 cm, which is normal.   EFW 3367 g (95%, AC >99%)  BPP     ASSESSMENT/COUNSELIN y.o.   35w5d gestation (Estimated Date of Delivery: 25).    -Pregnancy  [ X ] stable  [   ] improving [  ] worsening    Diagnoses and all orders for this visit:    1. Chronic hypertension affecting pregnancy (Primary)    2. Secondary polycythemia  Overview:  Patient's " hematologist is Dr Anna Corbin  He recommended prenatal vitamins without iron unless patient is anemic and avoiding transfusions unless hemoglobin less than 7.            Secondary mild polycythemia:  Previously counseled.  Pt started on ppx lovenox and to continue x 6 weeks PP--she will switch to heparin in next 2 weeks.  Her Hct is actually within the normal range at 36.      Patient's hematologist is Dr Castillo at [place]--He recommended prenatal vitamins without iron unless patient is anemic and avoiding transfusions unless hemoglobin less than 7.      She was previously tested further for JAK2 ([id number]), which was not mutated. Her EPO level was low normal at 8 and she typically manages this with 2 phlebotomies a year.  I would recommend monthly CBC and if her Hct >45 per heme--they need to see her and do a phlebotomy.      Last Hct 36.1 on 6/23    CHTN  No meds, no ASA due to allergy  Previously counseled.      Reviewed blood pressure logs in patient's planner. About half of her diastolics were in the 90's which is certainly increased for her.   We reviewed preeclampsia symptoms, as well in detail.  She denies these at this time.  She just had normal preE labs.  I hesitate to start her on antihypertensive medication because her systolics are often low normal and I do not want to bottom her out.  She understands.  I would recommend we now do twice weekly ANFS (will add on NST next week) and that she continues to monitor at home.  I would recommend a 37th week delivery given this trend towards increase in BP's to hopefully avoid preE for her.  She is amenable to this.  Discussed with Ashlyn Pedro, who agrees and will discuss further with pt on Monday--appreciate her care.      If she develops symptoms of preeclampsia or has BP >160/110 or a trend towards increase (consistently 140's systolic or 100's diastolic) she should present to labor and delivery for immediate evaluation. She understands.         Summary of  Plan  -Serial growth ultrasounds every 4 weeks (MFM)  -Starting at 32 weeks: now twice weekly ANFS given increase in BP.    -CBC's monthly at OB--have been <45  -Continue Lovenox-->heparin until delivery  -Delivery 37 weeks for trend towards increased BP's.      Follow-up: twice weekly between [facility] and Dr. Goldberg until delivery    Thank you for the consult and opportunity to care for this patient.  Please feel free to reach out with any questions or concerns.      I spent 25 minutes caring for this patient on this date of service. This time includes time spent by me in the following activities: preparing for the visit, reviewing tests, obtaining and/or reviewing a separately obtained history, performing a medically appropriate examination and/or evaluation, counseling and educating the patient/family/caregiver and independently interpreting results and communicating that information with the patient/family/caregiver with greater than 50% spent in counseling and coordination of care.     I confirm that all copied/forwarded documentation in this record has been carefully reviewed, updated as necessary, and accurately reflects the patient's current status and plan of care.       I spent 4 minutes on the separately reported service of US imaging not included in the time used to support the E/M service also reported today.      Shruti Morton MD [facility]  Maternal Fetal Medicine-[facility]  Office: [phone]  [email or web address]

## 2025-06-26 NOTE — PROGRESS NOTES
MATERNAL FETAL MEDICINE Consult Note    Dear Dr Jim Goldberg MD:    Thank you for your kind referral of Tuyet Bailey.  As you know, she is a 30 y.o.   35w5d gestation (Estimated Date of Delivery: 25). This is a consult.      Her antepartum course is complicated by:  Secondary polycythemia  CHTN--increasing diastolics    Aneuploidy Screening: low risk    HPI: Today, she denies headache, blurry vision, RUQ pain. No vaginal bleeding, no contractions. No complaints today.      Review of History:  Past Medical History:   Diagnosis Date    Anxiety     Chronic hypertension     occasional higher diastolic values- off BP meds x2 years    HSV infection     oral HSV- no outbreaks in years    Infertility, female     IUI pregnancy    Secondary polycythemia      Past Surgical History:   Procedure Laterality Date    WISDOM TOOTH EXTRACTION      no excessive bleeding       Social History     Socioeconomic History    Marital status:      Spouse name: Jose Luis  PT   Tobacco Use    Smoking status: Never     Passive exposure: Never    Smokeless tobacco: Never   Vaping Use    Vaping status: Never Used   Substance and Sexual Activity    Alcohol use: Not Currently    Drug use: Never    Sexual activity: Yes     Partners: Male     Birth control/protection: None     Family History   Problem Relation Age of Onset    Diabetes Father     Kidney cancer Father       Allergies   Allergen Reactions    Nsaids Anaphylaxis     Lip swelling    Sulfa Antibiotics Swelling      Current Outpatient Medications on File Prior to Visit   Medication Sig Dispense Refill    doxylamine (UNISOM) 25 MG tablet Take 1 tablet by mouth At Night As Needed for Sleep.      enoxaparin sodium (LOVENOX) 40 MG/0.4ML solution prefilled syringe syringe Inject 0.4 mL under the skin into the appropriate area as directed Daily. 30 mL 4    Prenatal Vit-Fe Fumarate-FA (PRENATAL VITAMINS PO)       valACYclovir (Valtrex) 1000 MG tablet Take 1 tablet by mouth 2 (Two)  "Times a Day As Needed (coldsore). 30 tablet 1    Heparin Sodium, Porcine, (heparin, porcine,) 5000 UNIT/ML injection Inject 1 mL under the skin into the appropriate area as directed Every 12 (Twelve) Hours for 30 days. (Patient not taking: Reported on 2025) 60 mL 0     No current facility-administered medications on file prior to visit.        Past obstetric, gynecological, medical, surgical, family and social history reviewed.  Relevant lab work and imaging reviewed.    Review of systems  As above in HPI     Vitals:    25 1525 25 1530   BP: 127/97 122/88   BP Location: Left arm Left arm   Patient Position: Sitting Sitting   Pulse: 74    Temp: 98.7 °F (37.1 °C)    TempSrc: Oral    SpO2: 100%    Weight: 97.6 kg (215 lb 3 oz)    Height: 157.5 cm (62\")        PHYSICAL EXAM   General: No acute distress  Respiratory: No increased work of breathing  Cardiac: reg rate   Abdominal: Gravid, nontender  Extremities: No significant edema  Neuro/psych: Alert and oriented, appropriate mood    ULTRASOUND   Please view full ultrasound note on Imaging tab   Cephalic presentation.  Anterior placenta.  GAMA 22 cm, which is normal.   EFW 3367 g (95%, AC >99%)  BPP 8/    ASSESSMENT/COUNSELIN y.o.   35w5d gestation (Estimated Date of Delivery: 25).    -Pregnancy  [ X ] stable  [   ] improving [  ] worsening    Diagnoses and all orders for this visit:    1. Chronic hypertension affecting pregnancy (Primary)    2. Secondary polycythemia  Overview:  Patient's hematologist is Dr Anna Corbin  He recommended prenatal vitamins without iron unless patient is anemic and avoiding transfusions unless hemoglobin less than 7.            Secondary mild polycythemia:  Previously counseled.  Pt started on ppx lovenox and to continue x 6 weeks PP--she will switch to heparin in next 2 weeks.  Her Hct is actually within the normal range at 36.      Patient's hematologist is Dr Castillo at [place]--He recommended " prenatal vitamins without iron unless patient is anemic and avoiding transfusions unless hemoglobin less than 7.      She was previously tested further for JAK2 ([id number]), which was not mutated. Her EPO level was low normal at 8 and she typically manages this with 2 phlebotomies a year.  I would recommend monthly CBC and if her Hct >45 per heme--they need to see her and do a phlebotomy.      Last Hct 36.1 on 6/23    CHTN  No meds, no ASA due to allergy  Previously counseled.      Reviewed blood pressure logs in patient's planner. About half of her diastolics were in the 90's which is certainly increased for her.   We reviewed preeclampsia symptoms, as well in detail.  She denies these at this time.  She just had normal preE labs.  I hesitate to start her on antihypertensive medication because her systolics are often low normal and I do not want to bottom her out.  She understands.  I would recommend we now do twice weekly ANFS (will add on NST next week) and that she continues to monitor at home.  I would recommend a 37th week delivery given this trend towards increase in BP's to hopefully avoid preE for her.  She is amenable to this.  Discussed with Ashlyn Pedro, who agrees and will discuss further with pt on Monday--appreciate her care.      If she develops symptoms of preeclampsia or has BP >160/110 or a trend towards increase (consistently 140's systolic or 100's diastolic) she should present to labor and delivery for immediate evaluation. She understands.         Summary of Plan  -Serial growth ultrasounds every 4 weeks (MFM)  -Starting at 32 weeks: now twice weekly ANFS given increase in BP.    -CBC's monthly at OB--have been <45  -Continue Lovenox-->heparin until delivery  -Delivery 37 weeks for trend towards increased BP's.      Follow-up: twice weekly between [facility] and Dr. Goldberg until delivery    Thank you for the consult and opportunity to care for this patient.  Please feel free to reach out with any questions  or concerns.      I spent 25 minutes caring for this patient on this date of service. This time includes time spent by me in the following activities: preparing for the visit, reviewing tests, obtaining and/or reviewing a separately obtained history, performing a medically appropriate examination and/or evaluation, counseling and educating the patient/family/caregiver and independently interpreting results and communicating that information with the patient/family/caregiver with greater than 50% spent in counseling and coordination of care.     I confirm that all copied/forwarded documentation in this record has been carefully reviewed, updated as necessary, and accurately reflects the patient's current status and plan of care.       I spent 4 minutes on the separately reported service of US imaging not included in the time used to support the E/M service also reported today.      Shruti Morton MD [facility]  Maternal Fetal Medicine-[facility]  Office: [phone]  [email or web address]